# Patient Record
Sex: FEMALE | Race: BLACK OR AFRICAN AMERICAN | HISPANIC OR LATINO | Employment: FULL TIME | URBAN - METROPOLITAN AREA
[De-identification: names, ages, dates, MRNs, and addresses within clinical notes are randomized per-mention and may not be internally consistent; named-entity substitution may affect disease eponyms.]

---

## 2017-09-21 ENCOUNTER — HOSPITAL ENCOUNTER (EMERGENCY)
Facility: HOSPITAL | Age: 22
Discharge: HOME | End: 2017-09-21
Attending: EMERGENCY MEDICINE | Admitting: EMERGENCY MEDICINE
Payer: COMMERCIAL

## 2017-09-21 VITALS
HEIGHT: 62 IN | OXYGEN SATURATION: 97 % | TEMPERATURE: 97.6 F | RESPIRATION RATE: 18 BRPM | DIASTOLIC BLOOD PRESSURE: 65 MMHG | WEIGHT: 223 LBS | HEART RATE: 100 BPM | BODY MASS INDEX: 41.04 KG/M2 | SYSTOLIC BLOOD PRESSURE: 154 MMHG

## 2017-09-21 DIAGNOSIS — M54.9 BACK PAIN: Primary | ICD-10-CM

## 2017-09-21 PROCEDURE — 99283 EMERGENCY DEPT VISIT LOW MDM: CPT

## 2017-09-21 RX ORDER — IBUPROFEN 800 MG/1
TABLET ORAL EVERY 8 HOURS PRN
COMMUNITY
End: 2021-01-22 | Stop reason: ALTCHOICE

## 2017-09-21 RX ORDER — METHYLPREDNISOLONE 4 MG/1
TABLET ORAL
Qty: 21 TABLET | Refills: 0 | Status: SHIPPED | OUTPATIENT
Start: 2017-09-21 | End: 2021-01-22 | Stop reason: ALTCHOICE

## 2017-09-21 RX ORDER — CYCLOBENZAPRINE HCL 10 MG
10 TABLET ORAL 3 TIMES DAILY PRN
Qty: 10 TABLET | Refills: 0 | Status: SHIPPED | OUTPATIENT
Start: 2017-09-21 | End: 2021-01-22 | Stop reason: ALTCHOICE

## 2017-09-21 RX ORDER — NAPROXEN 500 MG/1
500 TABLET ORAL 2 TIMES DAILY WITH MEALS
Qty: 14 TABLET | Refills: 0 | Status: SHIPPED | OUTPATIENT
Start: 2017-09-21 | End: 2021-01-22 | Stop reason: ALTCHOICE

## 2018-01-10 NOTE — MISCELLANEOUS
Message  DCP&P Hotline called in reference to noncompliance issues with prenatal care and 4 positive UDS results  Hotline  stated someone from the [facility]  office would be in contact with pt  within 72hrs  Pt has a [address] address listed in [employer] but has [place] Medicaid  In [facility] system her address is listed as [address] [place] [address] which is her Mother's address  DCP&P made aware and will refer case to CPS if warranted  Rodolfo Harry,  will also notify [employer] about PA address  Active Problems    1  BMI 36 0-36 9,adult (V85 36) (Z68 36)   2  Maternal morbid obesity in second trimester, antepartum (649 13,278 01)   (O99 212,E66 01)   3  Need for Tdap vaccination (V06 1) (Z23)   4  Pregnancy (V22 2) (Z33 1)    Current Meds   1  Prenatal Complete 14-0 4 MG Oral Tablet; TAKE 1 TABLET DAILY; Therapy: 11OND5556 to (Evaluate:19Mar2017)  Requested for: 11PHT3264; Last   Rx:24Mar2016 Ordered    Allergies    1  No Known Drug Allergies    2  Apples   3  No Known Latex Allergies   4   Seasonal    Signatures   Electronically signed by : Rach Hernandez RN; Sep  6 2016 12:57PM EST                       (Author)

## 2018-01-12 NOTE — PROGRESS NOTES
2016         RE: Bridgette Schwarz                                   To: [address]   MR#: [id number]                                  [address]   : [address], [address]    ENC: [id number]                             Fax: [phone]   (Exam #: [id number])      The LMP of this 21year old,  G2, P0-0-1-0 patient was 2016, giving   her an ART of OCT 7 2016 and a current gestational age of 22 weeks 5 days   by dates  A sonographic examination was performed on 2016 using real   time equipment  The ultrasound examination was performed using abdominal &   vaginal techniques  The patient has a BMI of 36 0  Her blood pressure   today was 119/51  Earliest ultrasound found in her record: 2016  14 w4d 10/01/2016  ART            Arely Lozano has no complaints today  She reports fetal movement and denies   vaginal bleeding  She has not had genetic screening obtained  Arely Lozano has   not yet been screened for gestational diabetes during this pregnancy        Cardiac motion was observed at 140 bpm       INDICATIONS      fetal anatomical survey   morbid obesity      Exam Types      LEVEL II   Transvaginal      RESULTS      Fetus # 1 of 1   Vertex presentation   Fetal growth appeared normal   Placenta Location = Posterior, right lateral   No placenta previa   Placenta Grade = I      MEASUREMENTS (* Included In Average GA)      AC              16 2 cm        21 weeks 0 days* (36%)   BPD              5 5 cm        22 weeks 5 days* (70%)   HC              20 5 cm        22 weeks 4 days* (66%)   Femur            4 2 cm        23 weeks 6 days* (85%)      Nuchal Fold      3 9 mm      Humerus          3 8 cm        23 weeks 3 days  (82%)   Radius           3 0 cm        22 weeks 5 days   Ulna             3 4 cm        23 weeks 0 days   Tibia            3 6 cm        23 weeks 2 days  (85%)   Fibula           3 6 cm        22 weeks 4 days      Cerebellum       2 5 cm        24 weeks 0 days   Biorbit          3 8 cm        24 weeks 1 day   CisternaMagna    3 2 mm      HC/AC           1 27   FL/AC           0 26   FL/BPD          0 76   EFW (Ac/Fl/Hc)   494 grams - 1 lbs 1 oz      THE AVERAGE GESTATIONAL AGE is 22 weeks 4 days +/- 14 days  AMNIOTIC FLUID         Largest Vertical Pocket = 4 1 cm   Amniotic Fluid: Normal      ANATOMY SUMMARY      The fetal cranium appeared normal in shape  Choroid plexus cysts are not   present  The lateral ventricles were not dilated and the midline   structures were not deviated  The cerebellum and cisterna magna were   visualized and appeared normal    The calvarium showed no evidence of   defect, scalloping of the parietal bones or abnormal shape  The cavum   septum pellucidum appears normal  The fetal face was not imaged well  Anatomy of the fetal thorax appeared within normal limits  The fetal   diaphragm appears intact  There were no intrathoracic masses noted or   evidence of pleural/pericardial effusion  The aaortic arch appears   normal  The cardiac rate and rhythm are normal  The four-chamber, outflow   tract, 3 vessel tracheal, ductal arch, septal, and short axis views are   suboptimally imaged  The abdominal cavity appears normal  There is no   evidence of fetal bowel obstruction or abnormally echogenic bowel  Ascites   is not present  The fetal stomach appears normal in size and shape  The   right kidney appears normal  There is no suspicion of pyelectasis  Renal   cysts are not present  The echogenicity of the kidney is normal  The left   kidney appears normal   There is no suspicion of pyelectasis  The   echogenicity of the kidney is normal   renal cysts are not present  The   fetal bladder appears normal in size and shape  There is no suspicion of   ureterocele  The abdominal wall appears intact  A normal abdominal cord   insertion is noted   The spine was visualized from cervical to sacral   region, within the resolution of the ultrasound equipment, without   evidence of a neural tube defect  or other malformation  Active movement   of the extremities was seen and fetal body motion was also observed during   this examination  There was no evidence of long bone abnormality  The   distal extremities are suboptimally imaged  We cannot determine the fetal   gender  The placenta appears normal  There is no evidence of advanced   placental maturation, placental abruption, intervillous thrombosis,   placental infarction or multiple venous lakes  There is a 3 vessel cord  The placental cord insertion site is suboptimally imaged  The uterine   contour appears normal  There is no suspicion of a uterine myoma  ADNEXA      The left ovary appeared normal and measured 2 0 x 1 4 x 1 2 cm with a   volume of 1 8 cc  The right ovary appeared normal and measured 3 0 x 2 8 x   1 6 cm with a volume of 7 0 cc  UTERINE ARTERIES                                  S/D   PI    RI    NOTCH       Left Uterine Artery              0 60       Right Uterine Artery             0 70      CERVICAL EVALUATION      The cervix appeared normal (Ultrasound Examination)  SUPINE      Cervical Length: 3 30 cm      OTHER TEST RESULTS           Funneling?: No             Dynamic Changes?: No        Resp  To TFP?: No      IMPRESSION      Hurtado IUP   22 weeks and 4 days by this ultrasound  (ART=OCT 1 2016)   Vertex presentation   Fetal growth appeared normal   Regular fetal heart rate of 140 bpm   Posterior, right lateral placenta   No placenta previa      GENERAL COMMENT      No fetal structural abnormality or ultrasound marker for aneuploidy is   identified on the Level II ultrasound study today in a difficult and quite   limited study secondary to the constraints related to maternal morbid   obesity and unfavorable fetal position   In particular, suboptimal imaging   of facial, cardiac, and distal extremity anatomy is afforded  The   placental cord insertion site is not imaged well  Fetal growth, amniotic   fluid volume, and maternal uterine artery Doppler study are normal   The   placenta is normal in appearance  The cervix is normal in appearance by transvaginal sonography  The   cervical length is normal   Cervical debris is not present  Cervical   funneling is not present  Neither provocative nor dynamic change is   appreciated  Today's ultrasound findings and suggested follow-up were discussed in   detail with Harvinder Atkinson  She is aware of the limitations on today's study and   inability to image all anatomic targets  We discussed that prenatal   ultrasound cannot rule out all congenital abnormalities  Harvinder Atkinson will return   to the [facility]  in 2 weeks to assess anatomic targets not imaged   well today  Repeat assessment of fetal interval growth will be performed   at about 28 weeks gestation  Gestational diabetes screening should be   performed soon  The face to face time, in addition to time spent discussing ultrasound   results, was 10 minutes, greater than 50% of which was spent during   counseling and coordination of care  Rachel Goodpasture, R D M S Lavaughn Chiquito, M D     Maternal-Fetal Medicine   Electronically signed 06/03/16 15:18

## 2018-01-12 NOTE — PROGRESS NOTES
SEP 6 2016         RE: Nikhil Mercedes                                   To: [address] [facility]   MR#: [id number]                                  [address]   : [address] [facility], [address]    Sharyn 25: [id number]                             Fax: [phone]   (Exam #: [id number])      The LMP of this 24year old,  G2, P0-0-1-0 patient was 2016, giving   her an ART of OCT 7 2016 and a current gestational age of 27 weeks 4 days   by dates  A sonographic examination was performed on SEP 6 2016 using real   time equipment  The ultrasound examination was performed using abdominal   technique  The patient has a BMI of 37 9  Her blood pressure today was   112/80  Earliest ultrasound found in her record: 2016  14 w4d 10/01/2016  ART      Problem list   1  Increased BMI- she did not complete her early Glucola screen   2  Patient did not complete her quad screen   3  + UDS for THC in early pregnancy      Cardiac motion was observed at 129 bpm       INDICATIONS      increased BMI   fetal growth   Evaluate missed anatomy      Exam Types      Level I      RESULTS      Fetus # 1 of 1   Vertex presentation   Fetal growth appeared normal   Placenta Location = Posterior   No placenta previa   Placenta Grade = II      MEASUREMENTS (* Included In Average GA)      AC              31 6 cm        35 weeks 5 days* (53%)   BPD              8 9 cm        35 weeks 6 days* (53%)   HC              31 6 cm        34 weeks 6 days* (27%)   Femur            6 8 cm        34 weeks 4 days* (42%)      Cerebellum       4 6 cm        35 weeks 5 days      HC/AC           1 00   FL/AC           0 22   FL/BPD          0 77   EFW (Ac/Fl/Hc)  2644 grams - 5 lbs 13 oz                 (40%)      THE AVERAGE GESTATIONAL AGE is 35 weeks 2 days +/- 21 days        AMNIOTIC FLUID      Q1: 2 7      Q2: 5 3      Q3: 2 8      Q4: 5 1   MARTINE Total = 15 8 cm   Amniotic Fluid: Normal ANATOMY DETAILS      Visualized Appearing Sonographically Normal:   HEAD: (Calvarium, BPD Level, Cavum, Lateral Ventricles, Choroid Plexus,   Cerebellum, Cisterna Magna); HEART: (Four Chamber View, Proximal Left   Outflow, Proximal Right Outflow, 3 Vessel Trachea, Interventricular   Septum, Interatrial Septum, Cardiac Axis, Cardiac Position);    STOMACH,   RIGHT KIDNEY, LEFT KIDNEY, BLADDER, PLACENTA      IMPRESSION      Hurtado IUP   35 weeks and 2 days by this ultrasound  (ART=OCT 9 2016)   Vertex presentation   Fetal growth appeared normal   Regular fetal heart rate of 129 bpm   Posterior placenta   No placenta previa      GENERAL COMMENT      On exam today the patient appears well, in no acute distress, and denies   any complaints  Her abdomen is non-tender  The fetal anatomic survey is now complete  There is no sonographic   evidence of fetal abnormalities at this time  The remainder of the survey   was completed previously  There has been appropriate interval fetal   growth  Good fetal movement and tone are seen  The amniotic fluid volume   appears normal   The placenta is posterior and it appears sonographically   normal   The patient was informed of today's findings and all of her   questions were answered  The limitations of ultrasound were reviewed with   the patient, which she accepts  Precautions and fetal kick counts were   reviewed  Recommend the patient return as clinically indicated  Thank you very much for allowing us to participate in the care of this   very nice patient  Should you have any questions, please do not hesitate   to contact our office  JASWANT Ramirez M D     Electronically signed 09/06/16 16:06

## 2018-01-13 NOTE — PROGRESS NOTES
Message    Type of Encounter: Telephonic    Spoke to Patient  The reason for call is to discuss outreach for follow up/needed services, coordination of meeting care plan treatment goals and results  Attempted to call pt for + THC UDS on 3/29/16 no answer, phone rang several times no answer  noted in chart pt needs UDS Every visit   next positive need to contact State report      Patient Care Team    Care Team Member Role Specialty Office Number   Dang GIBBS , MD, error  - [phone]   Ninoska GIBBS  - [phone]   Queta GIBBS  - [phone]   Noel Morales [facility] [phone]   Stacey GIBBS    - [phone] [facility] [phone]     Signatures   Electronically signed by : RENA Gee ; 2016  4:04PM EST

## 2018-01-14 NOTE — PROGRESS NOTES
2016         RE: Stephanie Buys                                   To: [facility]   MR#: [id number]                                  [address]   : [address]   Dyana Maritza: [id number]                             Fax: [phone]   (Exam #: [id number])      The LMP of this 21year old,  2, para 0 patient was 2016,   giving her an ART of OCT 7 2016 and a current gestational age of 17 weeks   5 days by dates  A sonographic examination was performed on 2016   using real time equipment  The patient has a BMI of 35 7  Her blood   pressure today was 136/85  Earliest ultrasound found in her record: 2016  14 w4d 10/01/2016  ART       Thank you very much for referring this very nice patient for a genetic   screening ultrasound and confirmation of dating ultrasound  This is the   patient's second pregnancy  Her first pregnancy resulted in a termination   in   She has a history of iron deficiency anemia and class II   obesity  She denies current use of tobacco, alcohol, or drugs  Her   medications include vitamins and she has a significant drug allergies  Her family medical history is noncontributory  A review of systems is   otherwise negative  On exam, the patient appears well, in no acute   distress, and her abdomen is nontender  Multiple longitudinal and transverse sections revealed a light   intrauterine pregnancy with the fetus in breech presentation  The placenta   is posterior in implantation, grade I in appearance        Cardiac motion was observed at 150 bpm       INDICATIONS      first trimester genetic screening   pregnancy dating   obesity      Exam Types      Level I   sequential screen      RESULTS      Fetus # 1 of 1   Breech presentation      MEASUREMENTS (* Included In Average GA)      AC               8 3 cm        14 weeks 1 day * (74%)   BPD              2 7 cm        14 weeks 5 days*   HC               9 6 cm        14 weeks 2 days*   Femur            1 7 cm        14 weeks 6 days*      HC/AC           1 16   FL/AC           0 20   FL/BPD          0 62   EFW (Ac/Fl/Hc)   102 grams - 0 lbs 4 oz      THE AVERAGE GESTATIONAL AGE is 14 weeks 4 days +/- 7 days  ANATOMY COMMENTS      Anatomic detail is limited at this gestational age  The fetal face   appears normal  The nasal bone was not seen well due to fetal position  The intracranial anatomy was unremarkable  Limited evaluation of the   spine revealed no obvious evidence for a neural tube defect  Anatomy of   the fetal thorax appeared within normal limits  The cardiac rhythm was   regular  Within the abdomen, the, stomach was visualized and the abdominal   wall appeared intact  A three vessel cord appears to be present  Active   movement of the fetal body & 4 extremities was seen  There is no   suspicion of a subchorionic bleed  The placental cord insertion was   normal  The culdesac was reviewed and no fluid was seen  ADNEXA      The left ovary appeared normal and measured 2 4 x 2 8 x 2 4 cm with a   volume of 8 4 cc  The right ovary was not visualized  AMNIOTIC FLUID      Largest Vertical Pocket = 3 8 cm   Amniotic Fluid: Normal      IMPRESSION      Hurtado IUP   14 weeks and 4 days by this ultrasound  (ART=OCT 1 2016)   Breech presentation   Regular fetal heart rate of 150 bpm   Posterior placenta      GENERAL COMMENT      Today's ultrasound is consistent with dating by LMP  Unfortunately, a   crown-rump length is too far along for sequential screening  Early   anatomy is overall reassuring although it is limited by difficult fetal   position and early gestational age  The patient can have a quad screen up until 22 weeks gestation, and I   advised her that she could obtain this requisition from your office should   she so desire        The implications of obesity and pregnancy are significant  The level of   obesity is directly related to the risk of adverse pregnancy outcomes   including but not limited to, risk of diabetes, hypertensive disorders of   pregnancy, macrosomia, intrauterine growth restriction, labor and shoulder   dystocias,  section, and increased risk of stillbirth  Recommend   discussing the current weight gain recommendations for women with obesity   and discussing good dietary practices as well as the safety of exercise in   pregnancy  I recommend the patient gain no more than 10 pounds throughout   her entire pregnancy, increase her exercise and follow healthy dietary   habits  Consider referral to a dietitian should the patient have   difficulty following the aforementioned recommendations  Recommend third   trimester growth ultrasounds to screen for fetal growth problems as well   as ensuring the patient is appropriately screened for pregestational and   gestational diabetes  A fetal anatomic survey is recommended at around 20 weeks  Thank you very much for allowing us to participate in the care of this   very nice patient  Should you have any questions, please do not hesitate   to contact our office  Please note, in addition to the time spent discussing the results of the   ultrasound, I spent approximately 15 minutes of face-to-face time with the   patient, greater than 50% of which was spent in counseling and the   coordination of care for this patient  JASWANT Valencia M D     Electronically signed 16 13:18

## 2018-01-15 NOTE — MISCELLANEOUS
Message  Pt  cancelled OB appt  for today, and has cancelled last 3 appt's also  She was a [facility] for her [address] appt  also  She has rescheduled both appt's for 9/6/16  Called pt  and stressed importance of keeping those appt's and reminded of policy to notify DYFS after 3 missed appts  Pt  stated she would keep the appts  Active Problems    1  BMI 36 0-36 9,adult (V85 36) (Z68 36)   2  Maternal morbid obesity in second trimester, antepartum (649 13,278 01)   (O99 212,E66 01)   3  Need for Tdap vaccination (V06 1) (Z23)   4  Pregnancy (V22 2) (Z33 1)    Current Meds   1  Prenatal Complete 14-0 4 MG Oral Tablet; TAKE 1 TABLET DAILY; Therapy: 32QJM1933 to (Evaluate:19Mar2017)  Requested for: 98RAH8066; Last   Rx:24Mar2016 Ordered    Allergies    1  No Known Drug Allergies    2  Apples   3  No Known Latex Allergies   4   Seasonal    Signatures   Electronically signed by : Kar Clark RN; Sep  1 2016 10:53AM EST                       (Author)

## 2018-01-16 NOTE — PROGRESS NOTES
Message    Type of Encounter: Telephonic    Spoke to Other   Received call from [place] DCP&P stating they would be referring this case to [place] CPS as pt  does reside in [place] now  Patient Care Team    Care Team Member Role Specialty Office Number   Erick GIBBS MD, error  - [phone]   Fifi GIBBS  - [phone]   Go GIBBS  - [phone]   Martín Morales [phone]   Archana GIBBS  - [phone]   [address] [place] [address] [phone]   Raymond GIBSB    [facility] [phone]     Signatures   Electronically signed by : Cassidy Pérez RN; Sep 12 2016  9:59AM EST                       (Author)

## 2018-01-16 NOTE — PROGRESS NOTES
RUTHIE 15 2016         RE: Brian Murray                                   To: [facility]   MR#: [id number]                                  [address]   : [address], [address]: [id number]                             Fax: [phone]   (Exam #: [id number])      The LMP of this 21year old,  G2, P0-0-1-0 patient was 2016, giving   her an ART of OCT 7 2016 and a current gestational age of 20 weeks 5 days   by dates  A sonographic examination was performed on RUTHIE 15 2016 using   real time equipment  The ultrasound examination was performed using   abdominal technique  The patient has a BMI of 37 1  Her blood pressure   today was 115/63  Earliest ultrasound found in her record: 2016  14 w4d 10/01/2016  ART      Problem list   1  Increased BMI- she did not complete her early Glucola screen   2  Patient did not complete her quad screen   3  + UDS for THC in early pregnancy      Cardiac motion was observed at 151 bpm       INDICATIONS      missed anatomy follow up   increased BMI      Exam Types      Level I      RESULTS      Fetus # 1 of 1   Vertex presentation   Placenta Location = Posterior, fundal   No placenta previa   Placenta Grade = II      AMNIOTIC FLUID         Largest Vertical Pocket = 4 0 cm   Amniotic Fluid: Normal      MEASUREMENTS (* Included In Average GA)      Femur            4 3 cm        24 weeks 1 day * (48%)      Humerus          4 1 cm        24 weeks 6 days  (67%)   Radius           3 6 cm        26 weeks 3 days   Ulna             3 9 cm        25 weeks 6 days   Tibia            4 1 cm        25 weeks 4 days  (86%)   Fibula           3 9 cm        23 weeks 6 days      THE AVERAGE GESTATIONAL AGE is 24 weeks 1 day +/- 14 days  ANATOMY COMMENTS      Fetal anatomy has been previously documented; no anomalies were   identified   The prior US was limited in the area of the TV Volume Wizard App, HEART, EXTREMITIES, PCI which were seen today and appear normal although the   cardiac septum views are fair and need to be repeated on a future scan  IMPRESSION      Hurtado IUP   24 weeks and 1 day by this ultrasound  (ART=OCT 4 2016)   Vertex presentation   Regular fetal heart rate of 151 bpm   Posterior, fundal placenta   No placenta previa      RECOMMENDATION      Growth Ultrasound: at 28 weeks      GENERAL COMMENT      We will review missed views of the fetal cardiac septum at her previously   scheduled growth visit at 28 weeks  JASWANT Lind M D     Maternal-Fetal Medicine   Electronically signed 06/15/16 19:35

## 2018-12-17 ENCOUNTER — HOSPITAL ENCOUNTER (EMERGENCY)
Facility: HOSPITAL | Age: 23
Discharge: HOME | End: 2018-12-17
Attending: EMERGENCY MEDICINE | Admitting: EMERGENCY MEDICINE
Payer: COMMERCIAL

## 2018-12-17 VITALS
OXYGEN SATURATION: 98 % | TEMPERATURE: 98.7 F | RESPIRATION RATE: 20 BRPM | BODY MASS INDEX: 39.67 KG/M2 | WEIGHT: 215.6 LBS | SYSTOLIC BLOOD PRESSURE: 120 MMHG | HEIGHT: 62 IN | HEART RATE: 113 BPM | DIASTOLIC BLOOD PRESSURE: 71 MMHG

## 2018-12-17 DIAGNOSIS — R11.2 NAUSEA VOMITING AND DIARRHEA: Primary | ICD-10-CM

## 2018-12-17 DIAGNOSIS — R19.7 NAUSEA VOMITING AND DIARRHEA: Primary | ICD-10-CM

## 2018-12-17 LAB
BACTERIA UR QL AUTO: ABNORMAL /HPF
BILIRUB UR QL STRIP: NEGATIVE
CLARITY UR: ABNORMAL
COLOR UR: YELLOW
EXT PREG TEST URINE: NEGATIVE
GLUCOSE UR STRIP-MCNC: NEGATIVE MG/DL
HGB UR QL STRIP.AUTO: NEGATIVE
KETONES UR STRIP-MCNC: NEGATIVE MG/DL
LEUKOCYTE ESTERASE UR QL STRIP: ABNORMAL
MUCOUS THREADS UR QL AUTO: ABNORMAL
NITRITE UR QL STRIP: NEGATIVE
NON-SQ EPI CELLS URNS QL MICRO: ABNORMAL /HPF
PH UR STRIP.AUTO: 6 [PH] (ref 4.5–8)
PROT UR STRIP-MCNC: NEGATIVE MG/DL
RBC #/AREA URNS AUTO: ABNORMAL /HPF
SP GR UR STRIP.AUTO: 1.02 (ref 1–1.03)
UROBILINOGEN UR QL STRIP.AUTO: 0.2 E.U./DL
WBC #/AREA URNS AUTO: ABNORMAL /HPF

## 2018-12-17 PROCEDURE — 81025 URINE PREGNANCY TEST: CPT | Performed by: PHYSICIAN ASSISTANT

## 2018-12-17 PROCEDURE — 81001 URINALYSIS AUTO W/SCOPE: CPT | Performed by: PHYSICIAN ASSISTANT

## 2018-12-17 PROCEDURE — 99283 EMERGENCY DEPT VISIT LOW MDM: CPT

## 2018-12-17 RX ORDER — DICYCLOMINE HCL 20 MG
20 TABLET ORAL 2 TIMES DAILY
Qty: 10 TABLET | Refills: 0 | Status: SHIPPED | OUTPATIENT
Start: 2018-12-17 | End: 2021-01-22 | Stop reason: ALTCHOICE

## 2018-12-17 RX ORDER — ONDANSETRON 4 MG/1
4 TABLET, ORALLY DISINTEGRATING ORAL EVERY 4 HOURS PRN
Qty: 10 TABLET | Refills: 0 | Status: SHIPPED | OUTPATIENT
Start: 2018-12-17 | End: 2021-01-22 | Stop reason: ALTCHOICE

## 2018-12-17 NOTE — DISCHARGE INSTRUCTIONS
Acute Nausea and Vomiting, Ambulatory Care   GENERAL INFORMATION:   Acute nausea and vomiting  starts suddenly, gets worse quickly, and lasts a short time  Nausea and vomiting may be caused by pregnancy, alcohol, infection, or medicines  Common related symptoms include the following:   · Fever    · Abdominal swelling    · Pain, tenderness, or a lump in the abdomen    · Splashing sounds heard in your stomach when you move  Seek immediate care for the following symptoms:   · Blood in your vomit or bowel movements    · Sudden, severe pain in your chest and upper abdomen after hard vomiting    · Dizziness, dry mouth, and thirst    · Urinating very little or not at all    · Muscle weakness, leg cramps, and trouble breathing    · A heart beat that is faster than normal    · Vomiting for more than 48 hours  Treatment for acute nausea and vomiting  may include medicines to calm your stomach and stop the vomiting  You may need IV fluids if you are dehydrated  Manage your nausea and vomiting:   · Drink liquids as directed to prevent dehydration  Ask how much liquid to drink each day and which liquids are best for you  You may need to drink an oral rehydration solution (ORS)  ORS contains water, salts, and sugar that are needed to replace the lost body fluids  Ask what kind of ORS to use, how much to drink, and where to get it  · Eat smaller meals, more often  Eat small amounts of food every 2 to 3 hours, even if you are not hungry  Food in your stomach may help decrease your nausea  · Avoid stress  Find ways to relax and manage your stress  Headaches due to stress may cause nausea and vomiting  Get more rest and sleep  Follow up with your healthcare provider as directed:  Write down your questions so you remember to ask them during your visits  CARE AGREEMENT:   You have the right to help plan your care  Learn about your health condition and how it may be treated   Discuss treatment options with your caregivers to decide what care you want to receive  You always have the right to refuse treatment  The above information is an  only  It is not intended as medical advice for individual conditions or treatments  Talk to your doctor, nurse or pharmacist before following any medical regimen to see if it is safe and effective for you  © 2014 [address] is for End User's use only and may not be sold, redistributed or otherwise used for commercial purposes  All illustrations and images included in CareNotes® are the copyrighted property of A [facility] [employer]  or Martínez Guzmán  Acute Diarrhea   WHAT YOU NEED TO KNOW:   Acute diarrhea starts quickly and lasts a short time, usually 1 to 3 days  It can last up to 2 weeks  You may not be able to control your diarrhea  Acute diarrhea usually stops on its own  DISCHARGE INSTRUCTIONS:   Return to the emergency department if:   · You feel confused  · Your heartbeat is faster than normal      · Your eyes look deeply sunken, or you have no tears when you cry  · You urinate less than usual, or your urine is dark yellow  · You have blood or mucus in your stools  · You have severe abdominal pain  · You are unable to drink any liquids  Contact your healthcare provider if:   · Your symptoms do not get better with treatment  · You have a fever higher than 101 3°F (38 5°C)  · You have trouble eating and drinking because you are vomiting  · You are thirsty or have a dry mouth  · Your diarrhea does not get better in 7 days  · You have questions or concerns about your condition or care  Follow up with your healthcare provider as directed:  Write down your questions so you remember to ask them during your visits  Medicines:  · Diarrhea medicine  is an over-the-counter medicine that helps slow or stop your diarrhea   If you take other medicines, talk to your healthcare provider before you take diarrhea medicine  · Antibiotics  may be given to help treat an infection caused by bacteria  · Antiparasitics  may be given to treat an infection caused by parasites  · Take your medicine as directed  Contact your healthcare provider if you think your medicine is not helping or if you have side effects  Tell him of her if you are allergic to any medicine  Keep a list of the medicines, vitamins, and herbs you take  Include the amounts, and when and why you take them  Bring the list or the pill bottles to follow-up visits  Carry your medicine list with you in case of an emergency  Self-care:   · Drink liquids as directed  Liquids will help prevent dehydration caused by diarrhea  Ask your healthcare provider how much liquid to drink each day and which liquids are best for you  You may need to drink an oral rehydration solution (ORS)  An ORS has the right amounts of water, salts, and sugar you need to replace body fluids  You can buy an ORS at most grocery stores and pharmacies  · Eat foods that are easy to digest   Examples include rice, lentils, cereal, bananas, potatoes, and bread  It also includes some fruits (bananas, melon), well-cooked vegetables, and lean meats  Avoid foods high in fiber, fat, and sugar  Also avoid caffeine, alcohol, dairy, and red meat until your diarrhea is gone  Prevent acute diarrhea:   · Wash your hands often  Use soap and water  Wash your hands before you eat or prepare food  Also wash your hands after you use the bathroom  Use an alcohol-based hand gel when soap and water are not available  · Keep bathroom surfaces clean  This helps prevent the spread of germs that cause acute diarrhea  · Wash fruits and vegetables well before you eat them  This can help remove germs that cause diarrhea  If possible, remove the skin from fruits and vegetables, or cook them well before you eat them  · Cook meat as directed        ¨ Cook ground meat  to 160°F      [employer] poultry, whole poultry, or cuts of poultry  to at least 165°F  Remove the meat from heat  Let it stand for 3 minutes before you eat it  ¨ Cook whole cuts of meat other than poultry  to at least 145°F  Remove the meat from heat  Let it stand for 3 minutes before you eat it  · Wash dishes that have touched raw meat with hot water and soap  This includes cutting boards, utensils, dishes, and serving containers  · Place raw or cooked meat in the refrigerator as soon as possible  Bacteria can grow in meat that is left at room temperature too long  · Do not eat raw or undercooked oysters, clams, or mussels  These foods may be contaminated and cause infection  · Drink filtered or treated water only when you travel  Do not put ice in your drinks  Drink bottled water whenever possible  © 2017 [address] Information is for End User's use only and may not be sold, redistributed or otherwise used for commercial purposes  All illustrations and images included in CareNotes® are the copyrighted property of [email or web address]  or Martínez Guzmán  The above information is an  only  It is not intended as medical advice for individual conditions or treatments  Talk to your doctor, nurse or pharmacist before following any medical regimen to see if it is safe and effective for you

## 2018-12-18 NOTE — ED PROVIDER NOTES
History  Chief Complaint   Patient presents with    Vomiting     Vomiting and diarrhea since last night       History provided by:  Patient  Vomiting   Severity:  Mild  Duration:  6 hours  Timing:  Intermittent  Number of daily episodes:  2  Quality:  Unable to specify  Able to tolerate:  Liquids and solids  Progression:  Unable to specify  Chronicity:  New  Recent urination:  Normal  Context: not post-tussive and not self-induced    Relieved by:  Nothing  Worsened by:  Nothing  Ineffective treatments:  None tried  Associated symptoms: abdominal pain and diarrhea    Associated symptoms: no arthralgias, no chills, no cough, no fever, no headaches, no myalgias, no sore throat and no URI    Abdominal pain:     Pain location: lower abdomen  Quality: cramping      Severity:  Moderate    Onset quality:  Sudden    Duration:  10 hours    Timing:  Sporadic    Progression: resolves after diarrhea stool x 3  Chronicity:  New  Diarrhea:     Quality:  Watery and semi-solid    Number of occurrences:  3    Duration:  10 hours    Timing:  Intermittent    Progression:  Unable to specify  Risk factors: sick contacts (son with recent same sx)    Risk factors: no alcohol use, no diabetes, not pregnant, no prior abdominal surgery, no suspect food intake and no travel to endemic areas        Prior to Admission Medications   Prescriptions Last Dose Informant Patient Reported? Taking? Methylprednisolone 4 MG TBPK   No No   Sig: Use as directed on package   cyclobenzaprine (FLEXERIL) 10 mg tablet   No No   Sig: Take 1 tablet by mouth 3 (three) times a day as needed for muscle spasms for up to 10 doses   ibuprofen (MOTRIN) 800 mg tablet   Yes No   Sig: Take by mouth every 8 (eight) hours as needed for mild pain   naproxen (NAPROSYN) 500 mg tablet   No No   Sig: Take 1 tablet by mouth 2 (two) times a day with meals for 7 days      Facility-Administered Medications: None       History reviewed   No pertinent past medical history  History reviewed  No pertinent surgical history  History reviewed  No pertinent family history  I have reviewed and agree with the history as documented  Social History   Substance Use Topics    Smoking status: Never Smoker    Smokeless tobacco: Never Used    Alcohol use No        Review of Systems   Constitutional: Negative for activity change, appetite change, chills and fever  HENT: Negative for congestion and sore throat  Eyes: Negative for pain and visual disturbance  Respiratory: Negative for cough and shortness of breath  Cardiovascular: Negative for chest pain and leg swelling  Gastrointestinal: Positive for abdominal pain, diarrhea, nausea and vomiting  Negative for abdominal distention, blood in stool and constipation  Genitourinary: Negative for decreased urine volume, difficulty urinating, flank pain and frequency  Musculoskeletal: Negative for arthralgias, back pain, gait problem, myalgias and neck pain  Skin: Negative for rash and wound  Allergic/Immunologic: Negative for immunocompromised state  Neurological: Negative for dizziness and headaches  Physical Exam  Physical Exam   Constitutional: She is oriented to person, place, and time  She appears well-developed and well-nourished  No distress  Pt eating a bosch/egg breakfast sandwich during my initial examination   HENT:   Head: Normocephalic and atraumatic  Nose: Nose normal    Mouth/Throat: Oropharynx is clear and moist    Eyes: Pupils are equal, round, and reactive to light  Conjunctivae are normal    Neck: Neck supple  Cardiovascular: Normal rate, regular rhythm, normal heart sounds and intact distal pulses  No murmur heard  Pulmonary/Chest: Effort normal and breath sounds normal  No respiratory distress  She exhibits no tenderness  Abdominal: Soft  Bowel sounds are normal  She exhibits no distension and no mass  There is no tenderness  There is no rebound and no guarding  No hernia  Musculoskeletal: She exhibits no edema  Neurological: She is alert and oriented to person, place, and time  Skin: Skin is warm and dry  Capillary refill takes less than 2 seconds  She is not diaphoretic  Psychiatric: She has a normal mood and affect  Nursing note and vitals reviewed        Vital Signs  ED Triage Vitals [12/17/18 0957]   Temperature Pulse Respirations Blood Pressure SpO2   98 7 °F (37 1 °C) (!) 113 20 120/71 98 %      Temp Source Heart Rate Source Patient Position - Orthostatic VS BP Location FiO2 (%)   Temporal Monitor Sitting Right arm --      Pain Score       --           Vitals:    12/17/18 0957   BP: 120/71   Pulse: (!) 113   Patient Position - Orthostatic VS: Sitting       Visual Acuity      ED Medications  Medications - No data to display    Diagnostic Studies  Results Reviewed     Procedure Component Value Units Date/Time    Urine Microscopic [[id number]]  (Abnormal) Resulted:  12/17/18 1127    Lab Status:  Final result Specimen:  Urine Updated:  12/17/18 1127     RBC, UA None Seen /hpf      WBC, UA 4-10 (A) /hpf      Epithelial Cells Moderate (A) /hpf      Bacteria, UA Occasional /hpf      MUCUS THREADS Occasional (A)    UA w Reflex to Microscopic w Reflex to Culture [[id number]]  (Abnormal) Resulted:  12/17/18 1110    Lab Status:  Final result Specimen:  Urine Updated:  12/17/18 1110     Color, UA Yellow     Clarity, UA Cloudy     Specific Gravity, UA 1 025     pH, UA 6 0     Leukocytes, UA Small (A)     Nitrite, UA Negative     Protein, UA Negative mg/dl      Glucose, UA Negative mg/dl      Ketones, UA Negative mg/dl      Urobilinogen, UA 0 2 E U /dl      Bilirubin, UA Negative     Blood, UA Negative    POCT pregnancy, urine [[id number]]  (Normal) Resulted:  12/17/18 1103    Lab Status:  Final result Updated:  12/17/18 1104     EXT PREG TEST UR (Ref: Negative) negative                 No orders to display              Procedures  Procedures       Phone Contacts  ED Phone Contact    ED Course  ED Course as of Dec 18 1338   Mon Dec 17, 2018   1115 Color, UA: Yellow   1115 Leukocytes, UA: (!) Small   1115 Nitrite, UA: Negative   1115 Blood, UA: Negative                               [facility]  Number of Diagnoses or Management Options  Nausea vomiting and diarrhea: new and requires workup     Amount and/or Complexity of Data Reviewed  Clinical lab tests: ordered and reviewed    Risk of Complications, Morbidity, and/or Mortality  Presenting problems: low  Diagnostic procedures: low  Management options: low    Patient Progress  Patient progress: stable    CritCare Time    Disposition  Final diagnoses:   Nausea vomiting and diarrhea     Time reflects when diagnosis was documented in both MDM as applicable and the Disposition within this note     Time User Action Codes Description Comment    12/17/2018 11:04 AM Sylwia Pham Add [R11 2,  R19 7] Nausea vomiting and diarrhea       ED Disposition     ED Disposition Condition Comment    Discharge  Sherine Rg discharge to home/self care      Condition at discharge: Good        Follow-up Information     Follow up With Specialties Details Why Contact Info    [employer]  Schedule an appointment as soon as possible for a visit To establish [phone]            Discharge Medication List as of 12/17/2018 11:06 AM      START taking these medications    Details   dicyclomine (BENTYL) 20 mg tablet Take 1 tablet (20 mg total) by mouth 2 (two) times a day, Starting Mon 12/17/2018, Normal      ondansetron (ZOFRAN-ODT) 4 mg disintegrating tablet Take 1 tablet (4 mg total) by mouth every 4 (four) hours as needed for nausea or vomiting, Starting Mon 12/17/2018, Normal         CONTINUE these medications which have NOT CHANGED    Details   cyclobenzaprine (FLEXERIL) 10 mg tablet Take 1 tablet by mouth 3 (three) times a day as needed for muscle spasms for up to 10 doses, Starting Thu 9/21/2017, Print      ibuprofen (MOTRIN) 800 mg tablet Take by mouth every 8 (eight) hours as needed for mild pain, Historical Med      Methylprednisolone 4 MG TBPK Use as directed on package, Print      naproxen (NAPROSYN) 500 mg tablet Take 1 tablet by mouth 2 (two) times a day with meals for 7 days, Starting Thu 9/21/2017, Until Thu 9/28/2017, Print           No discharge procedures on file      ED Provider  Electronically Signed by           Jose Greer PA-C  12/18/18 0874

## 2019-09-22 ENCOUNTER — HOSPITAL ENCOUNTER (EMERGENCY)
Facility: HOSPITAL | Age: 24
Discharge: HOME | End: 2019-09-22
Attending: EMERGENCY MEDICINE

## 2019-09-22 VITALS
HEIGHT: 62 IN | HEART RATE: 95 BPM | TEMPERATURE: 99 F | SYSTOLIC BLOOD PRESSURE: 164 MMHG | DIASTOLIC BLOOD PRESSURE: 72 MMHG | RESPIRATION RATE: 18 BRPM | OXYGEN SATURATION: 98 % | BODY MASS INDEX: 39.68 KG/M2 | WEIGHT: 215.61 LBS

## 2019-09-22 DIAGNOSIS — J03.90 ACUTE TONSILLITIS: Primary | ICD-10-CM

## 2019-09-22 PROCEDURE — 99282 EMERGENCY DEPT VISIT SF MDM: CPT

## 2019-09-22 PROCEDURE — 99284 EMERGENCY DEPT VISIT MOD MDM: CPT | Performed by: PHYSICIAN ASSISTANT

## 2019-09-22 RX ORDER — AMOXICILLIN 500 MG/1
500 CAPSULE ORAL 3 TIMES DAILY
Qty: 21 CAPSULE | Refills: 0 | Status: SHIPPED | OUTPATIENT
Start: 2019-09-22 | End: 2019-09-29

## 2019-09-22 NOTE — ED PROVIDER NOTES
History  Chief Complaint   Patient presents with    Sore Throat     sore throat with complaints of ear pain to the right ear     Patient presents to the emergency department today offering a chief complaint of a sore throat as well as left-sided ear pain  Patient states the symptoms have been present for 6 days  Pain is in the left ear as well as the back of the throat  Pain with swallowing  Occasional fever chills sweats  Denies cough  Otherwise asymptomatic  Prior to Admission Medications   Prescriptions Last Dose Informant Patient Reported? Taking? Methylprednisolone 4 MG TBPK   No No   Sig: Use as directed on package   cyclobenzaprine (FLEXERIL) 10 mg tablet   No No   Sig: Take 1 tablet by mouth 3 (three) times a day as needed for muscle spasms for up to 10 doses   dicyclomine (BENTYL) 20 mg tablet   No No   Sig: Take 1 tablet (20 mg total) by mouth 2 (two) times a day   ibuprofen (MOTRIN) 800 mg tablet   Yes No   Sig: Take by mouth every 8 (eight) hours as needed for mild pain   naproxen (NAPROSYN) 500 mg tablet   No No   Sig: Take 1 tablet by mouth 2 (two) times a day with meals for 7 days   ondansetron (ZOFRAN-ODT) 4 mg disintegrating tablet   No No   Sig: Take 1 tablet (4 mg total) by mouth every 4 (four) hours as needed for nausea or vomiting      Facility-Administered Medications: None       History reviewed  No pertinent past medical history  History reviewed  No pertinent surgical history  History reviewed  No pertinent family history  I have reviewed and agree with the history as documented  Social History     Tobacco Use    Smoking status: Never Smoker    Smokeless tobacco: Never Used   Substance Use Topics    Alcohol use: No    Drug use: No        Review of Systems   Constitutional: Negative  Negative for chills and fever  HENT: Positive for ear pain and sore throat  Negative for trouble swallowing  Eyes: Negative  Respiratory: Negative    Negative for cough, shortness of breath and wheezing  Cardiovascular: Negative  Negative for chest pain and leg swelling  Gastrointestinal: Negative  Negative for abdominal pain, blood in stool and vomiting  Endocrine: Negative  Genitourinary: Negative  Musculoskeletal: Negative  Negative for neck stiffness  Skin: Negative  Allergic/Immunologic: Negative  Neurological: Negative  Negative for dizziness, seizures, speech difficulty, weakness, light-headedness, numbness and headaches  Hematological: Negative  Psychiatric/Behavioral: Negative  All other systems reviewed and are negative  Physical Exam  Physical Exam   Constitutional: She is oriented to person, place, and time  Vital signs are normal  She appears well-developed and well-nourished  She does not have a sickly appearance  She does not appear ill  No distress  HENT:   Right Ear: Tympanic membrane and external ear normal  No swelling  Tympanic membrane is not bulging  Left Ear: Tympanic membrane and external ear normal  No swelling  Tympanic membrane is not bulging  Nose: Nose normal    Mouth/Throat: No oral lesions  No uvula swelling  Posterior oropharyngeal erythema present  No oropharyngeal exudate or posterior oropharyngeal edema  Tonsillar exudate  Eyes: Pupils are equal, round, and reactive to light  Conjunctivae, EOM and lids are normal    Neck: Normal range of motion  Neck supple  No JVD present  No tracheal deviation, no edema and normal range of motion present  No thyromegaly present  Cardiovascular: Normal rate, regular rhythm, normal heart sounds, intact distal pulses and normal pulses  Exam reveals no gallop and no friction rub  No murmur heard  Pulmonary/Chest: Effort normal and breath sounds normal  No stridor  No respiratory distress  She has no wheezes  She has no rales  She exhibits no tenderness  Abdominal: Soft  Bowel sounds are normal  She exhibits no distension and no mass  There is no tenderness   There is no rebound, no guarding and negative Winchester's sign  No hernia  Musculoskeletal: Normal range of motion  She exhibits no edema or tenderness  Lymphadenopathy:     She has no cervical adenopathy  Neurological: She is alert and oriented to person, place, and time  She has normal strength and normal reflexes  No cranial nerve deficit or sensory deficit  GCS eye subscore is 4  GCS verbal subscore is 5  GCS motor subscore is 6  Skin: Skin is warm and dry  Capillary refill takes less than 2 seconds  No rash noted  She is not diaphoretic  No erythema  No pallor  Psychiatric: She has a normal mood and affect  Her speech is normal and behavior is normal    Vitals reviewed  Vital Signs  ED Triage Vitals [09/22/19 1558]   Temperature Pulse Respirations Blood Pressure SpO2   99 °F (37 2 °C) 95 18 164/72 98 %      Temp Source Heart Rate Source Patient Position - Orthostatic VS BP Location FiO2 (%)   Temporal Monitor -- Right arm --      Pain Score       4           Vitals:    09/22/19 1558   BP: 164/72   Pulse: 95         Visual Acuity      ED Medications  Medications - No data to display    Diagnostic Studies  Results Reviewed     None                 No orders to display              Procedures  Procedures       ED Course                               MDM    Disposition  Final diagnoses:   Acute tonsillitis     Time reflects when diagnosis was documented in both MDM as applicable and the Disposition within this note     Time User Action Codes Description Comment    9/22/2019  4:01 PM Rogelio GIBBS Add [J03 90] Acute tonsillitis       ED Disposition     ED Disposition Condition Date/Time Comment    Discharge Stable Sun Sep 22, 2019  4:01 PM Nguyen Old discharge to home/self care              Follow-up Information     Follow up With Specialties Details Why Contact Info    PCP              Discharge Medication List as of 9/22/2019  4:02 PM      START taking these medications    Details   amoxicillin (AMOXIL) 500 mg capsule Take 1 capsule (500 mg total) by mouth 3 (three) times a day for 7 days, Starting Sun 9/22/2019, Until Sun 9/29/2019, Print         CONTINUE these medications which have NOT CHANGED    Details   cyclobenzaprine (FLEXERIL) 10 mg tablet Take 1 tablet by mouth 3 (three) times a day as needed for muscle spasms for up to 10 doses, Starting Thu 9/21/2017, Print      dicyclomine (BENTYL) 20 mg tablet Take 1 tablet (20 mg total) by mouth 2 (two) times a day, Starting Mon 12/17/2018, Normal      ibuprofen (MOTRIN) 800 mg tablet Take by mouth every 8 (eight) hours as needed for mild pain, Historical Med      Methylprednisolone 4 MG TBPK Use as directed on package, Print      naproxen (NAPROSYN) 500 mg tablet Take 1 tablet by mouth 2 (two) times a day with meals for 7 days, Starting Thu 9/21/2017, Until Thu 9/28/2017, Print      ondansetron (ZOFRAN-ODT) 4 mg disintegrating tablet Take 1 tablet (4 mg total) by mouth every 4 (four) hours as needed for nausea or vomiting, Starting Mon 12/17/2018, Normal           No discharge procedures on file      ED Provider  Electronically Signed by           Eron Short PA-C  09/22/19 0549

## 2019-11-08 ENCOUNTER — HOSPITAL ENCOUNTER (EMERGENCY)
Facility: HOSPITAL | Age: 24
Discharge: HOME | End: 2019-11-08
Attending: EMERGENCY MEDICINE | Admitting: EMERGENCY MEDICINE
Payer: COMMERCIAL

## 2019-11-08 VITALS
WEIGHT: 208.11 LBS | HEART RATE: 111 BPM | TEMPERATURE: 100.5 F | SYSTOLIC BLOOD PRESSURE: 133 MMHG | OXYGEN SATURATION: 98 % | BODY MASS INDEX: 38.06 KG/M2 | RESPIRATION RATE: 19 BRPM | DIASTOLIC BLOOD PRESSURE: 85 MMHG

## 2019-11-08 DIAGNOSIS — J03.90 ACUTE TONSILLITIS: Primary | ICD-10-CM

## 2019-11-08 PROCEDURE — 99284 EMERGENCY DEPT VISIT MOD MDM: CPT | Performed by: PHYSICIAN ASSISTANT

## 2019-11-08 PROCEDURE — 99282 EMERGENCY DEPT VISIT SF MDM: CPT

## 2019-11-08 RX ORDER — AMOXICILLIN AND CLAVULANATE POTASSIUM 875; 125 MG/1; MG/1
1 TABLET, FILM COATED ORAL EVERY 12 HOURS SCHEDULED
Qty: 14 TABLET | Refills: 0 | Status: SHIPPED | OUTPATIENT
Start: 2019-11-08 | End: 2019-11-15

## 2019-11-08 RX ORDER — AMOXICILLIN AND CLAVULANATE POTASSIUM 875; 125 MG/1; MG/1
1 TABLET, FILM COATED ORAL ONCE
Status: COMPLETED | OUTPATIENT
Start: 2019-11-08 | End: 2019-11-08

## 2019-11-08 RX ORDER — ACETAMINOPHEN 325 MG/1
650 TABLET ORAL ONCE
Status: COMPLETED | OUTPATIENT
Start: 2019-11-08 | End: 2019-11-08

## 2019-11-08 RX ADMIN — ACETAMINOPHEN 650 MG: 325 TABLET, FILM COATED ORAL at 09:32

## 2019-11-08 RX ADMIN — AMOXICILLIN AND CLAVULANATE POTASSIUM 1 TABLET: 875; 125 TABLET, FILM COATED ORAL at 09:33

## 2019-11-08 NOTE — ED PROVIDER NOTES
History  Chief Complaint   Patient presents with    Sore Throat     pt c/o sore throat, fever, head cold, ear pain  has been taking OTC cold/flu meds without relief  The patient presents to the emergency department today for evaluation of a sore throat  I did see her in the end of September she did have acute tonsillitis at that point she was treated successfully with penicillin products  She states she did well until last week when she noted a head cold  She notes facial pressure nasal congestion however the sore throat fevers began over the last few days  She round temperature of a 101° F last night  She is actually very well-appearing at bedside a does not appear toxic whatsoever  Heart rate initially was around 115 however upon resting was 93 beats per minute  She denies abdominal pain nausea vomiting  She denies cough  No ear pain  No urinary urgency frequency burning  She denies any possibility whatsoever of pregnancy secondary to her sexual preference  Prior to Admission Medications   Prescriptions Last Dose Informant Patient Reported? Taking?    Methylprednisolone 4 MG TBPK Not Taking at Unknown time  No No   Sig: Use as directed on package   Patient not taking: Reported on 11/8/2019   cyclobenzaprine (FLEXERIL) 10 mg tablet Not Taking at Unknown time  No No   Sig: Take 1 tablet by mouth 3 (three) times a day as needed for muscle spasms for up to 10 doses   Patient not taking: Reported on 11/8/2019   dicyclomine (BENTYL) 20 mg tablet Not Taking at Unknown time  No No   Sig: Take 1 tablet (20 mg total) by mouth 2 (two) times a day   Patient not taking: Reported on 11/8/2019   ibuprofen (MOTRIN) 800 mg tablet More than a month at Unknown time  Yes No   Sig: Take by mouth every 8 (eight) hours as needed for mild pain   naproxen (NAPROSYN) 500 mg tablet   No No   Sig: Take 1 tablet by mouth 2 (two) times a day with meals for 7 days   ondansetron (ZOFRAN-ODT) 4 mg disintegrating tablet Not Taking at Unknown time  No No   Sig: Take 1 tablet (4 mg total) by mouth every 4 (four) hours as needed for nausea or vomiting   Patient not taking: Reported on 11/8/2019      Facility-Administered Medications: None       History reviewed  No pertinent past medical history  History reviewed  No pertinent surgical history  History reviewed  No pertinent family history  I have reviewed and agree with the history as documented  Social History     Tobacco Use    Smoking status: Never Smoker    Smokeless tobacco: Never Used   Substance Use Topics    Alcohol use: No    Drug use: No        Review of Systems   Constitutional: Positive for fever  Negative for chills  HENT: Positive for sinus pressure, sinus pain and sore throat  Negative for trouble swallowing  Eyes: Negative  Respiratory: Negative  Negative for cough, shortness of breath and wheezing  Cardiovascular: Negative  Negative for chest pain and leg swelling  Gastrointestinal: Negative  Negative for abdominal pain, blood in stool and vomiting  Endocrine: Negative  Genitourinary: Negative  Musculoskeletal: Negative  Negative for neck stiffness  Skin: Negative  Allergic/Immunologic: Negative  Neurological: Negative  Negative for dizziness, seizures, speech difficulty, weakness, light-headedness, numbness and headaches  Hematological: Negative  Psychiatric/Behavioral: Negative  All other systems reviewed and are negative  Physical Exam  Physical Exam   Constitutional: She is oriented to person, place, and time  Vital signs are normal  She appears well-developed and well-nourished  She does not have a sickly appearance  She does not appear ill  No distress  HENT:   Right Ear: External ear normal  No swelling  Tympanic membrane is not bulging  Left Ear: External ear normal  No swelling  Tympanic membrane is not bulging     Nose: Nose normal    Mouth/Throat: Oropharyngeal exudate and posterior oropharyngeal erythema present  Tonsillar exudate  Boggy edematous nasal mucosa   Eyes: Pupils are equal, round, and reactive to light  Conjunctivae, EOM and lids are normal    Neck: Normal range of motion  Neck supple  No JVD present  No tracheal deviation, no edema and normal range of motion present  No thyromegaly present  Cardiovascular: Normal rate, regular rhythm, normal heart sounds, intact distal pulses and normal pulses  Exam reveals no gallop and no friction rub  No murmur heard  Pulmonary/Chest: Effort normal and breath sounds normal  No stridor  No respiratory distress  She has no wheezes  She has no rales  She exhibits no tenderness  Abdominal: Soft  Bowel sounds are normal  She exhibits no distension and no mass  There is no tenderness  There is no rebound, no guarding and negative Winchester's sign  No hernia  Musculoskeletal: Normal range of motion  She exhibits no edema or tenderness  Lymphadenopathy:     She has cervical adenopathy  Neurological: She is alert and oriented to person, place, and time  She has normal strength and normal reflexes  No cranial nerve deficit or sensory deficit  GCS eye subscore is 4  GCS verbal subscore is 5  GCS motor subscore is 6  Skin: Skin is warm and dry  Capillary refill takes less than 2 seconds  No rash noted  She is not diaphoretic  No erythema  No pallor  Psychiatric: She has a normal mood and affect  Her speech is normal and behavior is normal    Vitals reviewed        Vital Signs  ED Triage Vitals [11/08/19 0917]   Temperature Pulse Respirations Blood Pressure SpO2   100 5 °F (38 1 °C) (!) 111 19 133/85 98 %      Temp Source Heart Rate Source Patient Position - Orthostatic VS BP Location FiO2 (%)   Temporal Monitor Sitting Right arm --      Pain Score       5           Vitals:    11/08/19 0917   BP: 133/85   Pulse: (!) 111   Patient Position - Orthostatic VS: Sitting         Visual Acuity      ED Medications  Medications   acetaminophen (TYLENOL) tablet 650 mg (650 mg Oral Given 11/8/19 0932)   amoxicillin-clavulanate (AUGMENTIN) 875-125 mg per tablet 1 tablet (1 tablet Oral Given 11/8/19 0933)       Diagnostic Studies  Results Reviewed     None                 No orders to display              Procedures  Procedures       ED Course                               MDM    Disposition  Final diagnoses:   Acute tonsillitis     Time reflects when diagnosis was documented in both MDM as applicable and the Disposition within this note     Time User Action Codes Description Comment    11/8/2019  9:21 AM Satya Gonzalez [M58 [id number]] Acute tonsillitis       ED Disposition     ED Disposition Condition Date/Time Comment    Discharge Stable Fri Nov 8, 2019  9:21 AM Sofie Barreto discharge to home/self care  Follow-up Information     Follow up With Specialties Details Why Contact Anant Cano DO Otolaryngology Schedule an appointment as soon as possible for a visit   [address] [place], [place] [address]rick Beckley  [phone]            Patient's Medications   Discharge Prescriptions    AMOXICILLIN-CLAVULANATE (AUGMENTIN) 875-125 MG PER TABLET    Take 1 tablet by mouth every 12 (twelve) hours for 7 days       Start Date: 11/8/2019 End Date: 11/15/2019       Order Dose: 1 tablet       Quantity: 14 tablet    Refills: 0     No discharge procedures on file      ED Provider  Electronically Signed by           Layla Moon PA-C  11/08/19 7050

## 2019-11-12 ENCOUNTER — APPOINTMENT (EMERGENCY)
Dept: CT IMAGING | Facility: HOSPITAL | Age: 24
End: 2019-11-12

## 2019-11-12 ENCOUNTER — HOSPITAL ENCOUNTER (EMERGENCY)
Facility: HOSPITAL | Age: 24
Discharge: HOME | End: 2019-11-12
Attending: EMERGENCY MEDICINE | Admitting: EMERGENCY MEDICINE
Payer: COMMERCIAL

## 2019-11-12 VITALS
HEART RATE: 85 BPM | BODY MASS INDEX: 38.63 KG/M2 | OXYGEN SATURATION: 99 % | TEMPERATURE: 97.3 F | SYSTOLIC BLOOD PRESSURE: 125 MMHG | DIASTOLIC BLOOD PRESSURE: 75 MMHG | WEIGHT: 204.59 LBS | RESPIRATION RATE: 18 BRPM | HEIGHT: 61 IN

## 2019-11-12 DIAGNOSIS — R09.89 PHLEGM IN THROAT: Primary | ICD-10-CM

## 2019-11-12 DIAGNOSIS — J02.9 PHARYNGITIS: ICD-10-CM

## 2019-11-12 LAB
ALBUMIN SERPL BCP-MCNC: 3.5 G/DL (ref 3.5–5)
ALP SERPL-CCNC: 65 U/L (ref 46–116)
ALT SERPL W P-5'-P-CCNC: 22 U/L (ref 12–78)
ANION GAP SERPL CALCULATED.3IONS-SCNC: 8 MMOL/L (ref 4–13)
AST SERPL W P-5'-P-CCNC: 25 U/L (ref 5–45)
BASOPHILS # BLD MANUAL: 0 THOUSAND/UL (ref 0–0.1)
BASOPHILS NFR MAR MANUAL: 0 % (ref 0–1)
BILIRUB SERPL-MCNC: 0.4 MG/DL (ref 0.2–1)
BUN SERPL-MCNC: 6 MG/DL (ref 5–25)
CALCIUM SERPL-MCNC: 9 MG/DL (ref 8.3–10.1)
CHLORIDE SERPL-SCNC: 100 MMOL/L (ref 100–108)
CO2 SERPL-SCNC: 29 MMOL/L (ref 21–32)
CREAT SERPL-MCNC: 0.64 MG/DL (ref 0.6–1.3)
EOSINOPHIL # BLD MANUAL: 0 THOUSAND/UL (ref 0–0.4)
EOSINOPHIL NFR BLD MANUAL: 0 % (ref 0–6)
ERYTHROCYTE [DISTWIDTH] IN BLOOD BY AUTOMATED COUNT: 13.4 % (ref 11.6–15.1)
EXT PREG TEST URINE: NEGATIVE
EXT. CONTROL ED NAV: NORMAL
GFR SERPL CREATININE-BSD FRML MDRD: 144 ML/MIN/1.73SQ M
GLUCOSE SERPL-MCNC: 97 MG/DL (ref 65–140)
HCT VFR BLD AUTO: 40.5 % (ref 34.8–46.1)
HGB BLD-MCNC: 13.1 G/DL (ref 11.5–15.4)
LYMPHOCYTES # BLD AUTO: 28 % (ref 14–44)
LYMPHOCYTES # BLD AUTO: 3.1 THOUSAND/UL (ref 0.6–4.47)
MCH RBC QN AUTO: 27.3 PG (ref 26.8–34.3)
MCHC RBC AUTO-ENTMCNC: 32.3 G/DL (ref 31.4–37.4)
MCV RBC AUTO: 85 FL (ref 82–98)
MONOCYTES # BLD AUTO: 1.11 THOUSAND/UL (ref 0–1.22)
MONOCYTES NFR BLD: 10 % (ref 4–12)
NEUTROPHILS # BLD MANUAL: 6.87 THOUSAND/UL (ref 1.85–7.62)
NEUTS SEG NFR BLD AUTO: 62 % (ref 43–75)
NRBC BLD AUTO-RTO: 0 /100 WBCS
PLATELET # BLD AUTO: 349 THOUSANDS/UL (ref 149–390)
PLATELET BLD QL SMEAR: ADEQUATE
PMV BLD AUTO: 10 FL (ref 8.9–12.7)
POTASSIUM SERPL-SCNC: 3.2 MMOL/L (ref 3.5–5.3)
PROT SERPL-MCNC: 9.1 G/DL (ref 6.4–8.2)
RBC # BLD AUTO: 4.79 MILLION/UL (ref 3.81–5.12)
S PYO DNA THROAT QL NAA+PROBE: NORMAL
SODIUM SERPL-SCNC: 137 MMOL/L (ref 136–145)
TOTAL CELLS COUNTED SPEC: 100
WBC # BLD AUTO: 11.08 THOUSAND/UL (ref 4.31–10.16)

## 2019-11-12 PROCEDURE — 96365 THER/PROPH/DIAG IV INF INIT: CPT

## 2019-11-12 PROCEDURE — 85007 BL SMEAR W/DIFF WBC COUNT: CPT | Performed by: EMERGENCY MEDICINE

## 2019-11-12 PROCEDURE — 80053 COMPREHEN METABOLIC PANEL: CPT | Performed by: EMERGENCY MEDICINE

## 2019-11-12 PROCEDURE — 96361 HYDRATE IV INFUSION ADD-ON: CPT

## 2019-11-12 PROCEDURE — 85027 COMPLETE CBC AUTOMATED: CPT | Performed by: EMERGENCY MEDICINE

## 2019-11-12 PROCEDURE — 99284 EMERGENCY DEPT VISIT MOD MDM: CPT

## 2019-11-12 PROCEDURE — 99284 EMERGENCY DEPT VISIT MOD MDM: CPT | Performed by: EMERGENCY MEDICINE

## 2019-11-12 PROCEDURE — 86308 HETEROPHILE ANTIBODY SCREEN: CPT | Performed by: EMERGENCY MEDICINE

## 2019-11-12 PROCEDURE — 96375 TX/PRO/DX INJ NEW DRUG ADDON: CPT

## 2019-11-12 PROCEDURE — 70491 CT SOFT TISSUE NECK W/DYE: CPT

## 2019-11-12 PROCEDURE — 87651 STREP A DNA AMP PROBE: CPT | Performed by: EMERGENCY MEDICINE

## 2019-11-12 PROCEDURE — 81025 URINE PREGNANCY TEST: CPT | Performed by: EMERGENCY MEDICINE

## 2019-11-12 RX ORDER — KETOROLAC TROMETHAMINE 30 MG/ML
30 INJECTION, SOLUTION INTRAMUSCULAR; INTRAVENOUS ONCE
Status: COMPLETED | OUTPATIENT
Start: 2019-11-12 | End: 2019-11-12

## 2019-11-12 RX ORDER — ACETAMINOPHEN AND CODEINE PHOSPHATE 300; 30 MG/1; MG/1
1 TABLET ORAL EVERY 8 HOURS PRN
Qty: 6 TABLET | Refills: 0 | Status: SHIPPED | OUTPATIENT
Start: 2019-11-12 | End: 2019-11-12 | Stop reason: SDUPTHER

## 2019-11-12 RX ORDER — ACETAMINOPHEN AND CODEINE PHOSPHATE 300; 30 MG/1; MG/1
1 TABLET ORAL EVERY 8 HOURS PRN
Qty: 6 TABLET | Refills: 0 | Status: SHIPPED | OUTPATIENT
Start: 2019-11-12 | End: 2019-11-14

## 2019-11-12 RX ORDER — CLINDAMYCIN PHOSPHATE 600 MG/50ML
600 INJECTION INTRAVENOUS ONCE
Status: COMPLETED | OUTPATIENT
Start: 2019-11-12 | End: 2019-11-12

## 2019-11-12 RX ORDER — CLINDAMYCIN HYDROCHLORIDE 150 MG/1
300 CAPSULE ORAL EVERY 6 HOURS
Qty: 80 CAPSULE | Refills: 0 | Status: SHIPPED | OUTPATIENT
Start: 2019-11-12 | End: 2019-11-12 | Stop reason: SDUPTHER

## 2019-11-12 RX ORDER — DEXAMETHASONE SODIUM PHOSPHATE 10 MG/ML
10 INJECTION, SOLUTION INTRAMUSCULAR; INTRAVENOUS ONCE
Status: COMPLETED | OUTPATIENT
Start: 2019-11-12 | End: 2019-11-12

## 2019-11-12 RX ORDER — CLINDAMYCIN HYDROCHLORIDE 150 MG/1
300 CAPSULE ORAL EVERY 6 HOURS
Qty: 28 CAPSULE | Refills: 0 | Status: SHIPPED | OUTPATIENT
Start: 2019-11-12 | End: 2019-11-22

## 2019-11-12 RX ADMIN — CLINDAMYCIN PHOSPHATE 600 MG: 600 INJECTION, SOLUTION INTRAVENOUS at 21:17

## 2019-11-12 RX ADMIN — IOHEXOL 100 ML: 350 INJECTION, SOLUTION INTRAVENOUS at 21:49

## 2019-11-12 RX ADMIN — SODIUM CHLORIDE 1000 ML: 0.9 INJECTION, SOLUTION INTRAVENOUS at 21:07

## 2019-11-12 RX ADMIN — DEXAMETHASONE SODIUM PHOSPHATE 10 MG: 10 INJECTION, SOLUTION INTRAMUSCULAR; INTRAVENOUS at 21:13

## 2019-11-12 RX ADMIN — KETOROLAC TROMETHAMINE 30 MG: 30 INJECTION, SOLUTION INTRAMUSCULAR at 21:15

## 2019-11-13 LAB — HETEROPH AB SER QL: NEGATIVE

## 2019-11-13 NOTE — ED PROVIDER NOTES
History  Chief Complaint   Patient presents with    Sore Throat     has been taking ABX for infected tonsils, has been unable to keep ABX down, throat not feeling better, getting hard to swallow     Patient is a 71-year-old female otherwise healthy coming in today complaining of worsening sore throat  She states that she was here approximately 4 5 days ago and was given a prescription for Augmentin which she has been taking  She states that she initially had fevers which have subsequently had decreased  She but she feels that her throat was worsening  She has difficulty swallowing but is able to maintain her secretions  She has no recent travel, sick contacts, recent      History provided by:  Patient   used: No    Sore Throat   Location:  Right  Quality:  Aching, sharp and sore  Severity:  Moderate  Onset quality:  Gradual  Timing:  Constant  Progression:  Worsening  Chronicity:  Recurrent  Relieved by:  Nothing  Worsened by:  Nothing  Ineffective treatments: Augmentin, Motrin  Associated symptoms: adenopathy, fever and trouble swallowing    Associated symptoms: no abdominal pain, no chest pain, no chills, no cough, no drooling, no ear discharge, no ear pain, no epistaxis, no eye discharge, no headaches, no neck stiffness, no night sweats, no plugged ear sensation, no postnasal drip, no rash, no rhinorrhea, no shortness of breath, no sinus congestion, no stridor and no voice change    Risk factors: no exposure to strep, no exposure to mono, no sick contacts, no recent dental procedure, no recent endoscopy and no recent ENT procedure        Prior to Admission Medications   Prescriptions Last Dose Informant Patient Reported? Taking?    Methylprednisolone 4 MG TBPK   No No   Sig: Use as directed on package   Patient not taking: Reported on 11/8/2019   amoxicillin-clavulanate (AUGMENTIN) 875-125 mg per tablet   No No   Sig: Take 1 tablet by mouth every 12 (twelve) hours for 7 days cyclobenzaprine (FLEXERIL) 10 mg tablet   No No   Sig: Take 1 tablet by mouth 3 (three) times a day as needed for muscle spasms for up to 10 doses   Patient not taking: Reported on 11/8/2019   dicyclomine (BENTYL) 20 mg tablet   No No   Sig: Take 1 tablet (20 mg total) by mouth 2 (two) times a day   Patient not taking: Reported on 11/8/2019   ibuprofen (MOTRIN) 800 mg tablet   Yes No   Sig: Take by mouth every 8 (eight) hours as needed for mild pain   naproxen (NAPROSYN) 500 mg tablet   No No   Sig: Take 1 tablet by mouth 2 (two) times a day with meals for 7 days   ondansetron (ZOFRAN-ODT) 4 mg disintegrating tablet   No No   Sig: Take 1 tablet (4 mg total) by mouth every 4 (four) hours as needed for nausea or vomiting   Patient not taking: Reported on 11/8/2019      Facility-Administered Medications: None       History reviewed  No pertinent past medical history  History reviewed  No pertinent surgical history  History reviewed  No pertinent family history  I have reviewed and agree with the history as documented  Social History     Tobacco Use    Smoking status: Never Smoker    Smokeless tobacco: Never Used   Substance Use Topics    Alcohol use: No    Drug use: No        Review of Systems   Constitutional: Positive for fever  Negative for chills, diaphoresis and night sweats  HENT: Positive for sore throat and trouble swallowing  Negative for drooling, ear discharge, ear pain, nosebleeds, postnasal drip, rhinorrhea and voice change  Eyes: Negative  Negative for discharge and visual disturbance  Respiratory: Negative  Negative for cough, chest tightness, shortness of breath and stridor  Cardiovascular: Negative  Negative for chest pain and palpitations  Gastrointestinal: Negative  Negative for abdominal pain, nausea and vomiting  Endocrine: Negative  Genitourinary: Negative  Negative for difficulty urinating and dysuria     Musculoskeletal: Negative for back pain, neck pain and neck stiffness  Skin: Negative for rash  Neurological: Negative  Negative for weakness and headaches  Hematological: Positive for adenopathy  Psychiatric/Behavioral: Negative  Negative for confusion  All other systems reviewed and are negative  Physical Exam  Physical Exam   Constitutional: She is oriented to person, place, and time  She appears well-developed and well-nourished  No distress  HENT:   Head: Normocephalic and atraumatic  Right Ear: Hearing, tympanic membrane, external ear and ear canal normal    Left Ear: Hearing, tympanic membrane, external ear and ear canal normal    Nose: Nose normal    Mouth/Throat: Oropharynx is clear and moist        Eyes: Pupils are equal, round, and reactive to light  Conjunctivae and EOM are normal    Neck: Normal range of motion  Neck supple  Cardiovascular: Normal rate, regular rhythm, normal heart sounds and intact distal pulses  No murmur heard  Pulses:       Radial pulses are 2+ on the right side, and 2+ on the left side  Dorsalis pedis pulses are 2+ on the right side, and 2+ on the left side  Pulmonary/Chest: Effort normal and breath sounds normal  No stridor  No respiratory distress  Abdominal: Soft  Bowel sounds are normal  She exhibits no distension  There is no tenderness  Musculoskeletal: Normal range of motion  She exhibits no edema  Neurological: She is alert and oriented to person, place, and time  No cranial nerve deficit  GCS eye subscore is 4  GCS verbal subscore is 5  GCS motor subscore is 6  No slurred speech  No facial asymmetry  No ataxia  Skin: Skin is warm  Capillary refill takes less than 2 seconds  She is not diaphoretic  Psychiatric: She has a normal mood and affect  Nursing note and vitals reviewed        Vital Signs  ED Triage Vitals [11/12/19 2032]   Temperature Pulse Respirations Blood Pressure SpO2   (!) 97 3 °F (36 3 °C) 94 18 130/60 99 %      Temp Source Heart Rate Source Patient Position - Orthostatic VS BP Location FiO2 (%)   Temporal Monitor Sitting Right arm --      Pain Score       9           Vitals:    11/12/19 2032 11/12/19 2115 11/12/19 2311   BP: 130/60 130/80 125/75   Pulse: 94 84 85   Patient Position - Orthostatic VS: Sitting Sitting Sitting         Visual Acuity      ED Medications  Medications   sodium chloride 0 9 % bolus 1,000 mL (0 mL Intravenous Stopped 11/12/19 2253)   dexamethasone (PF) (DECADRON) injection 10 mg (10 mg Intravenous Given 11/12/19 2113)   ketorolac (TORADOL) injection 30 mg (30 mg Intravenous Given 11/12/19 2115)   clindamycin (CLEOCIN) IVPB (premix) 600 mg (0 mg Intravenous Stopped 11/12/19 2147)   iohexol (OMNIPAQUE) 350 MG/ML injection (MULTI-DOSE) 100 mL (100 mL Intravenous Given 11/12/19 2149)       Diagnostic Studies  Results Reviewed     Procedure Component Value Units Date/Time    Strep A PCR [[id number]]  (Normal) Collected:  11/12/19 2156    Lab Status:  Final result Specimen:  Throat Updated:  11/12/19 2239     STREP A PCR None Detected    Strep A PCR [[id number]]     Lab Status:  No result Specimen:  Throat     CBC and differential [[id number]]  (Abnormal) Collected:  11/12/19 2105    Lab Status:  Final result Specimen:  Blood from Arm, Right Updated:  11/12/19 2134     WBC 11 08 Thousand/uL      RBC 4 79 Million/uL      Hemoglobin 13 1 g/dL      Hematocrit 40 5 %      MCV 85 fL      MCH 27 3 pg      MCHC 32 3 g/dL      RDW 13 4 %      MPV 10 0 fL      Platelets 370 Thousands/uL      nRBC 0 /100 WBCs     Narrative: This is an appended report  These results have been appended to a previously verified report      Comprehensive metabolic panel [[id number]]  (Abnormal) Collected:  11/12/19 2105    Lab Status:  Final result Specimen:  Blood from Arm, Right Updated:  11/12/19 2129     Sodium 137 mmol/L      Potassium 3 2 mmol/L      Chloride 100 mmol/L      CO2 29 mmol/L      ANION GAP 8 mmol/L      BUN 6 mg/dL      Creatinine 0 64 mg/dL      Glucose 97 mg/dL      Calcium 9 0 mg/dL      AST 25 U/L      ALT 22 U/L      Alkaline Phosphatase 65 U/L      Total Protein 9 1 g/dL      Albumin 3 5 g/dL      Total Bilirubin 0 40 mg/dL      eGFR 144 ml/min/1 73sq m     Narrative:       Nolan guidelines for Chronic Kidney Disease (CKD):     Stage 1 with normal or high GFR (GFR > 90 mL/min/1 73 square meters)    Stage 2 Mild CKD (GFR = 60-89 mL/min/1 73 square meters)    Stage 3A Moderate CKD (GFR = 45-59 mL/min/1 73 square meters)    Stage 3B Moderate CKD (GFR = 30-44 mL/min/1 73 square meters)    Stage 4 Severe CKD (GFR = 15-29 mL/min/1 73 square meters)    Stage 5 End Stage CKD (GFR <15 mL/min/1 73 square meters)  Note: GFR calculation is accurate only with a steady state creatinine    Mononucleosis screen [[id number]] Collected:  11/12/19 2105    Lab Status: In process Specimen:  Blood from Arm, Right Updated:  11/12/19 2111    POCT pregnancy, urine [[id number]]  (Normal) Resulted:  11/12/19 2104    Lab Status:  Final result Updated:  11/12/19 2104     EXT PREG TEST UR (Ref: Negative) Negative     Control Valid                 CT soft tissue neck with contrast   Final Result by Melonie Haywood DO (11/12 2242)       large phlegmon in the right palantine tonsil  with narrowing of the airway  Workstation performed: [id number]                    Procedures  Procedures       ED Course  ED Course as of Nov 12 2336 Tue Nov 12, 2019 2059 Patient is a 63-year-old female coming in today with worsening sore throat  On exam patient is maintaining airway and secretions but is concerning for peritonsillar abscess  Will start with IV fluids, labs, Decadron, Toradol, CTA as well as clindamycin  She is agreeable plan of care  Portions of the record may have been created with voice recognition software  Occasional wrong word or "sound a like" substitutions may have occurred due to the inherent limitations of voice recognition software   Read the chart carefully and recognize, using context, where substitutions have occurred  2156 Patient's labs are stable no evidence of end-organ damage  Pending CT results as well as strep  2206 Patient updated on labs  She is resting in bed and feels much more comfortable  2242 Strep test negative  Pending mono  2250 Patient ct with large phlegmon and narrowing of the airway  Patient maintaining airway and secretions  ENT on call for 's       2257 No one on call  Long discussion with patient and she feels well  She has no sob and her throat feels better  Wishes to go home  MDM    Disposition  Final diagnoses:   Phlegm in throat   Pharyngitis     Time reflects when diagnosis was documented in both MDM as applicable and the Disposition within this note     Time User Action Codes Description Comment    11/12/2019 10:59 PM Ana Gonzalez Add [R09 89] Phlegm in throat     11/12/2019 11:00 PM Jason Chin Add [J02 9] Pharyngitis       ED Disposition     ED Disposition Condition Date/Time Comment    Discharge Stable Tue Nov 12, 2019 [address] discharge to home/self care              Follow-up Information     Follow up With Specialties Details Why Contact Info Additional [address] Otolaryngology Schedule an appointment as soon as possible for a visit in 1 week  [facility] [address], [address] [place] [place], [place] [address]    [facility] Otolaryngology Schedule an appointment as soon as possible for a visit in 1 week  [address] [facility] [address]  [facility], [address] [place] [address], [address] [phone]          Discharge Medication List as of 11/12/2019 11:20 PM      START taking these medications    Details   acetaminophen-codeine (TYLENOL #3) 300-30 mg per tablet Take 1 tablet by mouth every 8 (eight) hours as needed for moderate pain for up to 2 days, Starting Tue 11/12/2019, Until Thu 11/14/2019, Normal      clindamycin (CLEOCIN) 150 mg capsule Take 2 capsules (300 mg total) by mouth every 6 (six) hours for 10 days, Starting Tue 11/12/2019, Until Fri 11/22/2019, Normal         CONTINUE these medications which have NOT CHANGED    Details   amoxicillin-clavulanate (AUGMENTIN) 875-125 mg per tablet Take 1 tablet by mouth every 12 (twelve) hours for 7 days, Starting Fri 11/8/2019, Until Fri 11/15/2019, Print      cyclobenzaprine (FLEXERIL) 10 mg tablet Take 1 tablet by mouth 3 (three) times a day as needed for muscle spasms for up to 10 doses, Starting Thu 9/21/2017, Print      dicyclomine (BENTYL) 20 mg tablet Take 1 tablet (20 mg total) by mouth 2 (two) times a day, Starting Mon 12/17/2018, Normal      ibuprofen (MOTRIN) 800 mg tablet Take by mouth every 8 (eight) hours as needed for mild pain, Historical Med      Methylprednisolone 4 MG TBPK Use as directed on package, Print      naproxen (NAPROSYN) 500 mg tablet Take 1 tablet by mouth 2 (two) times a day with meals for 7 days, Starting Thu 9/21/2017, Until Thu 9/28/2017, Print      ondansetron (ZOFRAN-ODT) 4 mg disintegrating tablet Take 1 tablet (4 mg total) by mouth every 4 (four) hours as needed for nausea or vomiting, Starting Mon 12/17/2018, Normal           No discharge procedures on file      ED Provider  Electronically Signed by           Diego Salguero DO  11/12/19 6838

## 2019-11-13 NOTE — DISCHARGE INSTRUCTIONS
STOP AUGMENTIN AND CONTINUE THE CLINDAMYCIN  WHILE TAKING ANTIBIOTICS MAKE SURE YOU TAKE ACIDOPHILUS, LACTOBACILLUS, OR EAT A YOGURT TO HELP PREVENT YEAST INFECTION  YOUR PRESCRIPTIONS WERE SENT TO THE PHARMACY    YOU MUST CALL EAR NOSE AND THROAT TOMORROW FOR FOLLOW-UP  AS DISCUSSED, IF YOU HAVE WORSENING SORE THROAT, DIFFICULTY SWALLOWING OR NEW ONSET SHORTNESS OF BREATH FEVERS OR ANY OTHER CONCERN RETURN TO THE ER

## 2020-03-18 ENCOUNTER — APPOINTMENT (EMERGENCY)
Dept: ULTRASOUND IMAGING | Facility: HOSPITAL | Age: 25
End: 2020-03-18
Payer: COMMERCIAL

## 2020-03-18 ENCOUNTER — HOSPITAL ENCOUNTER (EMERGENCY)
Facility: HOSPITAL | Age: 25
Discharge: HOME | End: 2020-03-18
Attending: EMERGENCY MEDICINE
Payer: COMMERCIAL

## 2020-03-18 VITALS
BODY MASS INDEX: 37.57 KG/M2 | TEMPERATURE: 99.1 F | SYSTOLIC BLOOD PRESSURE: 100 MMHG | DIASTOLIC BLOOD PRESSURE: 51 MMHG | WEIGHT: 204.15 LBS | OXYGEN SATURATION: 98 % | HEART RATE: 70 BPM | HEIGHT: 62 IN | RESPIRATION RATE: 14 BRPM

## 2020-03-18 DIAGNOSIS — O41.8X10 SUBCHORIONIC HEMORRHAGE IN FIRST TRIMESTER: ICD-10-CM

## 2020-03-18 DIAGNOSIS — Z3A.01 LESS THAN 8 WEEKS GESTATION OF PREGNANCY: Primary | ICD-10-CM

## 2020-03-18 DIAGNOSIS — R11.2 NAUSEA & VOMITING: ICD-10-CM

## 2020-03-18 DIAGNOSIS — O46.8X1 SUBCHORIONIC HEMORRHAGE IN FIRST TRIMESTER: ICD-10-CM

## 2020-03-18 LAB
ALBUMIN SERPL BCP-MCNC: 3.9 G/DL (ref 3.5–5)
ALP SERPL-CCNC: 51 U/L (ref 46–116)
ALT SERPL W P-5'-P-CCNC: 22 U/L (ref 12–78)
ANION GAP SERPL CALCULATED.3IONS-SCNC: 10 MMOL/L (ref 4–13)
AST SERPL W P-5'-P-CCNC: 14 U/L (ref 5–45)
B-HCG SERPL-ACNC: ABNORMAL MIU/ML
BACTERIA UR QL AUTO: ABNORMAL /HPF
BASOPHILS # BLD AUTO: 0.03 THOUSANDS/ΜL (ref 0–0.1)
BASOPHILS NFR BLD AUTO: 0 % (ref 0–1)
BILIRUB SERPL-MCNC: 0.6 MG/DL (ref 0.2–1)
BILIRUB UR QL STRIP: NEGATIVE
BUN SERPL-MCNC: 7 MG/DL (ref 5–25)
CALCIUM SERPL-MCNC: 9.2 MG/DL (ref 8.3–10.1)
CHLORIDE SERPL-SCNC: 102 MMOL/L (ref 100–108)
CLARITY UR: ABNORMAL
CO2 SERPL-SCNC: 26 MMOL/L (ref 21–32)
COLOR UR: YELLOW
CREAT SERPL-MCNC: 0.6 MG/DL (ref 0.6–1.3)
EOSINOPHIL # BLD AUTO: 0.03 THOUSAND/ΜL (ref 0–0.61)
EOSINOPHIL NFR BLD AUTO: 0 % (ref 0–6)
ERYTHROCYTE [DISTWIDTH] IN BLOOD BY AUTOMATED COUNT: 13.3 % (ref 11.6–15.1)
EXT PREG TEST URINE: POSITIVE
EXT. CONTROL ED NAV: ABNORMAL
GFR SERPL CREATININE-BSD FRML MDRD: 148 ML/MIN/1.73SQ M
GLUCOSE SERPL-MCNC: 90 MG/DL (ref 65–140)
GLUCOSE UR STRIP-MCNC: NEGATIVE MG/DL
HCT VFR BLD AUTO: 40.2 % (ref 34.8–46.1)
HGB BLD-MCNC: 12.9 G/DL (ref 11.5–15.4)
HGB UR QL STRIP.AUTO: NEGATIVE
IMM GRANULOCYTES # BLD AUTO: 0.03 THOUSAND/UL (ref 0–0.2)
IMM GRANULOCYTES NFR BLD AUTO: 0 % (ref 0–2)
KETONES UR STRIP-MCNC: NEGATIVE MG/DL
LEUKOCYTE ESTERASE UR QL STRIP: ABNORMAL
LIPASE SERPL-CCNC: 79 U/L (ref 73–393)
LYMPHOCYTES # BLD AUTO: 2.77 THOUSANDS/ΜL (ref 0.6–4.47)
LYMPHOCYTES NFR BLD AUTO: 33 % (ref 14–44)
MAGNESIUM SERPL-MCNC: 1.6 MG/DL (ref 1.6–2.6)
MCH RBC QN AUTO: 27.6 PG (ref 26.8–34.3)
MCHC RBC AUTO-ENTMCNC: 32.1 G/DL (ref 31.4–37.4)
MCV RBC AUTO: 86 FL (ref 82–98)
MONOCYTES # BLD AUTO: 0.71 THOUSAND/ΜL (ref 0.17–1.22)
MONOCYTES NFR BLD AUTO: 8 % (ref 4–12)
MUCOUS THREADS UR QL AUTO: ABNORMAL
NEUTROPHILS # BLD AUTO: 4.88 THOUSANDS/ΜL (ref 1.85–7.62)
NEUTS SEG NFR BLD AUTO: 59 % (ref 43–75)
NITRITE UR QL STRIP: NEGATIVE
NON-SQ EPI CELLS URNS QL MICRO: ABNORMAL /HPF
NRBC BLD AUTO-RTO: 0 /100 WBCS
PH UR STRIP.AUTO: 6 [PH]
PLATELET # BLD AUTO: 279 THOUSANDS/UL (ref 149–390)
PMV BLD AUTO: 10.4 FL (ref 8.9–12.7)
POTASSIUM SERPL-SCNC: 3.9 MMOL/L (ref 3.5–5.3)
PROT SERPL-MCNC: 8.3 G/DL (ref 6.4–8.2)
PROT UR STRIP-MCNC: NEGATIVE MG/DL
RBC # BLD AUTO: 4.68 MILLION/UL (ref 3.81–5.12)
RBC #/AREA URNS AUTO: ABNORMAL /HPF
SODIUM SERPL-SCNC: 138 MMOL/L (ref 136–145)
SP GR UR STRIP.AUTO: 1.02 (ref 1–1.03)
UROBILINOGEN UR QL STRIP.AUTO: 0.2 E.U./DL
WBC # BLD AUTO: 8.45 THOUSAND/UL (ref 4.31–10.16)
WBC #/AREA URNS AUTO: ABNORMAL /HPF

## 2020-03-18 PROCEDURE — 99284 EMERGENCY DEPT VISIT MOD MDM: CPT

## 2020-03-18 PROCEDURE — 81001 URINALYSIS AUTO W/SCOPE: CPT | Performed by: PHYSICIAN ASSISTANT

## 2020-03-18 PROCEDURE — 85025 COMPLETE CBC W/AUTO DIFF WBC: CPT | Performed by: PHYSICIAN ASSISTANT

## 2020-03-18 PROCEDURE — 83690 ASSAY OF LIPASE: CPT | Performed by: PHYSICIAN ASSISTANT

## 2020-03-18 PROCEDURE — 96361 HYDRATE IV INFUSION ADD-ON: CPT

## 2020-03-18 PROCEDURE — 83735 ASSAY OF MAGNESIUM: CPT | Performed by: PHYSICIAN ASSISTANT

## 2020-03-18 PROCEDURE — 76801 OB US < 14 WKS SINGLE FETUS: CPT

## 2020-03-18 PROCEDURE — 36415 COLL VENOUS BLD VENIPUNCTURE: CPT | Performed by: PHYSICIAN ASSISTANT

## 2020-03-18 PROCEDURE — 80053 COMPREHEN METABOLIC PANEL: CPT | Performed by: PHYSICIAN ASSISTANT

## 2020-03-18 PROCEDURE — 84702 CHORIONIC GONADOTROPIN TEST: CPT | Performed by: PHYSICIAN ASSISTANT

## 2020-03-18 PROCEDURE — 99284 EMERGENCY DEPT VISIT MOD MDM: CPT | Performed by: PHYSICIAN ASSISTANT

## 2020-03-18 PROCEDURE — 81025 URINE PREGNANCY TEST: CPT | Performed by: PHYSICIAN ASSISTANT

## 2020-03-18 PROCEDURE — 96374 THER/PROPH/DIAG INJ IV PUSH: CPT

## 2020-03-18 RX ORDER — FAMOTIDINE 20 MG
1 TABLET ORAL DAILY
Qty: 30 EACH | Refills: 0 | Status: SHIPPED | OUTPATIENT
Start: 2020-03-18 | End: 2021-01-22 | Stop reason: ALTCHOICE

## 2020-03-18 RX ORDER — ONDANSETRON 2 MG/ML
4 INJECTION INTRAMUSCULAR; INTRAVENOUS ONCE
Status: COMPLETED | OUTPATIENT
Start: 2020-03-18 | End: 2020-03-18

## 2020-03-18 RX ORDER — DOXYLAMINE SUCCINATE AND PYRIDOXINE HYDROCHLORIDE, DELAYED RELEASE TABLETS 10 MG/10 MG 10; 10 MG/1; MG/1
2 TABLET, DELAYED RELEASE ORAL
Qty: 30 TABLET | Refills: 0 | Status: SHIPPED | OUTPATIENT
Start: 2020-03-18 | End: 2021-01-22 | Stop reason: ALTCHOICE

## 2020-03-18 RX ADMIN — ONDANSETRON 4 MG: 2 INJECTION INTRAMUSCULAR; INTRAVENOUS at 10:39

## 2020-03-18 RX ADMIN — SODIUM CHLORIDE 1000 ML: 0.9 INJECTION, SOLUTION INTRAVENOUS at 10:39

## 2020-03-18 NOTE — CASE MANAGEMENT
I self referred the patient to discuss resources with her  The patient asked about a PCP I provided her with a local PCP list     The patient lives with her mother in a 3rd floor apartment  There is [address]  The patient has no DME  She does not receive meals on wheels or home health services at this time  She is independent with her ADL's  She does not drive her mother drives her to where she needs to go  She uses [facility] in [facility]  She has no trouble getting or paying for her medications  She has Medical assistance

## 2020-03-18 NOTE — DISCHARGE INSTRUCTIONS
Rest, plenty of fluids  Start prenatal vitamin daily  Diclegis at bedtime for nausea/vomiting  Follow up with OB/GYN or return to ER as needed

## 2020-03-18 NOTE — ED PROVIDER NOTES
History  Chief Complaint   Patient presents with    Vomiting     Intermittent vomiting and abdominal pain for one month     25year old female presents ambulatory from home for evaluation of nausea, vomiting and abdominal pain  She notes symptoms started last month  She notes she had 2 days of nausea, vomiting last month  She thought she had a stomach bug  She notes symptoms then improved  However, intermittently she's had a few episodes of vomiting but mainly persistent nausea  She reports getting intermittent pain in her upper abdomen as well  Symptoms worse with eating  She has been able to tolerate liquids  No specific alleviating factors  No treatments tried  Denies diarrhea, constipation  Denies any urinary complaints  Denies fever, chills  Denies vaginal bleeding or discharge  She denies pregnancy however notes her LMP was the beginning of February  She is A1  She is unsure if she could be pregnant  No current birth control  Denies sick contacts  Denies recent illness  Denies recent travel  PMH unremarkable  No prior surgeries reported  Denies any current daily medications  History provided by:  Patient   used: No    Vomiting   Timing:  Intermittent  Quality:  Bilious material  Progression:  Partially resolved  Chronicity:  New  Relieved by:  Nothing  Exacerbated by: eating  Ineffective treatments:  None tried  Associated symptoms: abdominal pain    Associated symptoms: no chills, no cough, no diarrhea, no fever, no headaches, no myalgias and no sore throat    Risk factors: no prior abdominal surgery, no sick contacts, no suspect food intake and no travel to endemic areas        Prior to Admission Medications   Prescriptions Last Dose Informant Patient Reported? Taking?    Methylprednisolone 4 MG TBPK   No No   Sig: Use as directed on package   Patient not taking: Reported on 2019   cyclobenzaprine (FLEXERIL) 10 mg tablet   No No   Sig: Take 1 tablet by mouth 3 (three) times a day as needed for muscle spasms for up to 10 doses   Patient not taking: Reported on 11/8/2019   dicyclomine (BENTYL) 20 mg tablet   No No   Sig: Take 1 tablet (20 mg total) by mouth 2 (two) times a day   Patient not taking: Reported on 11/8/2019   ibuprofen (MOTRIN) 800 mg tablet   Yes No   Sig: Take by mouth every 8 (eight) hours as needed for mild pain   naproxen (NAPROSYN) 500 mg tablet   No No   Sig: Take 1 tablet by mouth 2 (two) times a day with meals for 7 days   ondansetron (ZOFRAN-ODT) 4 mg disintegrating tablet   No No   Sig: Take 1 tablet (4 mg total) by mouth every 4 (four) hours as needed for nausea or vomiting   Patient not taking: Reported on 11/8/2019      Facility-Administered Medications: None       History reviewed  No pertinent past medical history  History reviewed  No pertinent surgical history  History reviewed  No pertinent family history  I have reviewed and agree with the history as documented  E-Cigarette/Vaping     E-Cigarette/Vaping Substances     Social History     Tobacco Use    Smoking status: Never Smoker    Smokeless tobacco: Never Used   Substance Use Topics    Alcohol use: No    Drug use: No       Review of Systems   Constitutional: Negative  Negative for chills, fatigue and fever  HENT: Negative  Negative for congestion, rhinorrhea and sore throat  Eyes: Negative  Negative for visual disturbance  Respiratory: Negative  Negative for cough, shortness of breath and wheezing  Cardiovascular: Negative  Negative for chest pain, palpitations and leg swelling  Gastrointestinal: Positive for abdominal pain, nausea and vomiting  Negative for constipation and diarrhea  Genitourinary: Negative  Negative for dysuria, flank pain, frequency, hematuria, pelvic pain, vaginal bleeding and vaginal discharge  Musculoskeletal: Negative  Negative for back pain and myalgias  Skin: Negative  Negative for rash  Neurological: Negative  Negative for dizziness, light-headedness and headaches  Psychiatric/Behavioral: Negative  Negative for confusion  All other systems reviewed and are negative  Physical Exam  Physical Exam   Constitutional: She is oriented to person, place, and time  She appears well-developed and well-nourished  Non-toxic appearance  No distress  HENT:   Head: Normocephalic and atraumatic  Right Ear: Hearing, tympanic membrane, external ear and ear canal normal    Left Ear: Hearing, tympanic membrane, external ear and ear canal normal    Nose: Nose normal    Mouth/Throat: Uvula is midline, oropharynx is clear and moist and mucous membranes are normal  No oropharyngeal exudate  Eyes: Pupils are equal, round, and reactive to light  Conjunctivae and EOM are normal  No scleral icterus  Neck: Trachea normal and normal range of motion  Neck supple  No tracheal deviation present  Cardiovascular: Normal rate, regular rhythm, normal heart sounds, intact distal pulses and normal pulses  No murmur heard  Pulmonary/Chest: Effort normal and breath sounds normal  No tachypnea  No respiratory distress  She has no wheezes  She has no rhonchi  She has no rales  Abdominal: Soft  Bowel sounds are normal  She exhibits no distension  There is no hepatosplenomegaly  There is tenderness (mild) in the suprapubic area  There is no rigidity, no rebound, no guarding and no CVA tenderness  No hernia  Genitourinary:   Genitourinary Comments: Pt deferred   Musculoskeletal: Normal range of motion  She exhibits no edema or tenderness  Neurological: She is alert and oriented to person, place, and time  No cranial nerve deficit or sensory deficit  She exhibits normal muscle tone  Gait normal  GCS eye subscore is 4  GCS verbal subscore is 5  GCS motor subscore is 6  Skin: Skin is warm and dry  Capillary refill takes less than 2 seconds  No rash noted  Psychiatric: She has a normal mood and affect   Her speech is normal and behavior is normal    Nursing note and vitals reviewed        Vital Signs  ED Triage Vitals [03/18/20 1025]   Temperature Pulse Respirations Blood Pressure SpO2   99 1 °F (37 3 °C) 95 16 110/68 100 %      Temp Source Heart Rate Source Patient Position - Orthostatic VS BP Location FiO2 (%)   Oral Monitor Sitting Right arm --      Pain Score       5           Vitals:    03/18/20 1025 03/18/20 1230   BP: 110/68 100/51   Pulse: 95 70   Patient Position - Orthostatic VS: Sitting          Visual Acuity      ED Medications  Medications   ondansetron (ZOFRAN) injection 4 mg (4 mg Intravenous Given 3/18/20 1039)   sodium chloride 0 9 % bolus 1,000 mL (0 mL Intravenous Stopped 3/18/20 1139)       Diagnostic Studies  Results Reviewed     Procedure Component Value Units Date/Time    Quantitative hCG [[id number]]  (Abnormal) Collected:  03/18/20 1042    Lab Status:  Final result Specimen:  Blood from Arm, Left Updated:  03/18/20 1128     HCG, Quant 71,766 mIU/mL     Narrative:        Expected Ranges:     Approximate               Approximate HCG  Gestation age          Concentration ( mIU/mL)  _____________          ______________________   Romeo Gulling                      HCG values  0 2-1                       5-50  1-2                           2-3                         100-5000  3-4                         500-12801  4-5                         1000-12686  5-6                         [id number]  6-8                         [id number]  8-12                        [id number]      Comprehensive metabolic panel [[id number]]  (Abnormal) Collected:  03/18/20 1042    Lab Status:  Final result Specimen:  Blood from Arm, Left Updated:  03/18/20 1105     Sodium 138 mmol/L      Potassium 3 9 mmol/L      Chloride 102 mmol/L      CO2 26 mmol/L      ANION GAP 10 mmol/L      BUN 7 mg/dL      Creatinine 0 60 mg/dL      Glucose 90 mg/dL      Calcium 9 2 mg/dL      AST 14 U/L      ALT 22 U/L      Alkaline Phosphatase 51 U/L      Total Protein 8 3 g/dL      Albumin 3 9 g/dL      Total Bilirubin 0 60 mg/dL      eGFR 148 ml/min/1 73sq m     Narrative:       [place] guidelines for Chronic Kidney Disease (CKD):     Stage 1 with normal or high GFR (GFR > 90 mL/min/1 73 square meters)    Stage 2 Mild CKD (GFR = 60-89 mL/min/1 73 square meters)    Stage 3A Moderate CKD (GFR = 45-59 mL/min/1 73 square meters)    Stage 3B Moderate CKD (GFR = 30-44 mL/min/1 73 square meters)    Stage 4 Severe CKD (GFR = 15-29 mL/min/1 73 square meters)    Stage 5 End Stage CKD (GFR <15 mL/min/1 73 square meters)  Note: GFR calculation is accurate only with a steady state creatinine    Urine Microscopic [[id number]]  (Abnormal) Collected:  03/18/20 1035    Lab Status:  Final result Specimen:  Urine, Clean Catch Updated:  03/18/20 1104     RBC, UA None Seen /hpf      WBC, UA 2-4 /hpf      Epithelial Cells Occasional /hpf      Bacteria, UA Occasional /hpf      MUCUS THREADS Occasional    Magnesium [[id number]]  (Normal) Collected:  03/18/20 1042    Lab Status:  Final result Specimen:  Blood from Arm, Left Updated:  03/18/20 1103     Magnesium 1 6 mg/dL     Lipase [[id number]]  (Normal) Collected:  03/18/20 1042    Lab Status:  Final result Specimen:  Blood from Arm, Left Updated:  03/18/20 1103     Lipase 79 u/L     UA w Reflex to Microscopic w Reflex to Culture [[id number]]  (Abnormal) Collected:  03/18/20 1035    Lab Status:  Final result Specimen:  Urine, Clean Catch Updated:  03/18/20 1052     Color, UA Yellow     Clarity, UA Slightly Cloudy     Specific Gravity, UA 1 025     pH, UA 6 0     Leukocytes, UA Trace     Nitrite, UA Negative     Protein, UA Negative mg/dl      Glucose, UA Negative mg/dl      Ketones, UA Negative mg/dl      Urobilinogen, UA 0 2 E U /dl      Bilirubin, UA Negative     Blood, UA Negative    CBC and differential [[id number]] Collected:  03/18/20 1042    Lab Status:  Final result Specimen:  Blood from Arm, Left Updated:  03/18/20 1049 WBC 8 45 Thousand/uL      RBC 4 68 Million/uL      Hemoglobin 12 9 g/dL      Hematocrit 40 2 %      MCV 86 fL      MCH 27 6 pg      MCHC 32 1 g/dL      RDW 13 3 %      MPV 10 4 fL      Platelets 243 Thousands/uL      nRBC 0 /100 WBCs      Neutrophils Relative 59 %      Immat GRANS % 0 %      Lymphocytes Relative 33 %      Monocytes Relative 8 %      Eosinophils Relative 0 %      Basophils Relative 0 %      Neutrophils Absolute 4 88 Thousands/µL      Immature Grans Absolute 0 03 Thousand/uL      Lymphocytes Absolute 2 77 Thousands/µL      Monocytes Absolute 0 71 Thousand/µL      Eosinophils Absolute 0 03 Thousand/µL      Basophils Absolute 0 03 Thousands/µL     POCT pregnancy, urine [[id number]]  (Abnormal) Resulted:  03/18/20 1035    Lab Status:  Final result Updated:  03/18/20 1035     EXT PREG TEST UR (Ref: Negative) positive     Control valid                 US OB < 14 weeks with transvaginal   Final Result by Fahad Ruvalcaba MD (03/18 1239)      Single live intrauterine gestation at 7 weeks 4 days (range +/- 4 days)  Small subchorionic hemorrhage present  Trace free fluid in the pelvis  ART of 10/31/2020  Workstation performed: [id number]                    Procedures  Procedures         ED Course  ED Course as of Mar 18 1315   Wed Mar 18, 2020   1037 PREGNANCY TEST URINE: positive   1040 Pt updated on positive urine preg  Will obtain hcg in addition to her labs and proceed with transvaginal US to confirm IUP given her abdominal pain        1050 WBC: 8 45   1050 Hemoglobin: 12 9   1050 Platelet Count: 165   1053 Color, UA: Yellow   1053 Clarity, UA: Slightly Cloudy   1053 SL AMB SPECIFIC GRAVITY_URINE: 1 025   1053 pH, UA: 6 0   1053 Leukocytes, UA(!): Trace   1053 Nitrite, UA: Negative   1053 POCT URINE PROTEIN: Negative   1053 Glucose, UA: Negative   1053 Ketones, UA: Negative   1053 SL AMB POCT UROBILINOGEN: 0 2   1053 Bilirubin, UA: Negative   1053 Blood, UA: Negative   1108 Glucose, Random: 90   1108 Creatinine: 0 60   1108 BUN: 7   1108 Sodium: 138   1108 Potassium: 3 9   1108 Chloride: 102   1108 CO2: 26   1108 Anion Gap: 10   1108 Calcium: 9 2   1108 AST: 14   1108 ALT: 22   1108 Alkaline Phosphatase: 51   1108 Total Protein(!): 8 3   1108 Albumin: 3 9   1108 TOTAL BILIRUBIN: 0 60   1108 eGFR: 148   1108 Magnesium: 1 6   1108 Lipase: 79   1108 RBC, UA: None Seen   1109 WBC, UA(!): 2-4   1109 Epithelial Cells: Occasional   1109 Bacteria, UA: Occasional   1109 MUCUS THREADS(!): Occasional   1129 HCG QUANTITATIVE(!): 71,766   1158 US performed and pending interpretation  Pt updated on results thus far  She is feeling improved  [address] results  1245 IMPRESSION:     Single live intrauterine gestation at 7 weeks 4 days (range +/- 4 days)  Small subchorionic hemorrhage present  Trace free fluid in the pelvis      ART of 10/31/2020  US OB < 14 weeks with transvaginal     Pt afebrile and hemodynamically stable  She is tolerating PO  Findings reviewed with pt and mother  She is shocked/upset about the pregnancy  Reassurance given and resources provided  Advised rest, fluids  Will Rx diclegis for nausea/vomiting  Recommended prenatal vitamin daily and outpatient follow up with OB/GYN  Strict return precautions outlined  Advised outpatient follow up with OB/GYN or return to ER for change in condition as outlined  Pt verbalized understanding and had no further questions                                MDM  Number of Diagnoses or Management Options  Less than 8 weeks gestation of pregnancy: new and requires workup  Nausea & vomiting: new and requires workup  Subchorionic hemorrhage in first trimester: new and does not require workup     Amount and/or Complexity of Data Reviewed  Clinical lab tests: ordered and reviewed  Tests in the radiology section of CPT®: ordered and reviewed  Decide to obtain previous medical records or to obtain history from someone other than the patient: yes  Obtain history from someone other than the patient: yes  Review and summarize past medical records: yes  Independent visualization of images, tracings, or specimens: yes    Patient Progress  Patient progress: improved        Disposition  Final diagnoses:   Less than 8 weeks gestation of pregnancy   Nausea & vomiting   Subchorionic hemorrhage in first trimester     Time reflects when diagnosis was documented in both MDM as applicable and the Disposition within this note     Time User Action Codes Description Comment    3/18/2020 12:46 PM Frannie Rutherford Add [Z3A 01] Less than 8 weeks gestation of pregnancy     3/18/2020 12:46 PM Frannie Rutherford Add [R11 2] Nausea & vomiting     3/18/2020 12:54 PM Frannie Rutherford Add [O41 8X10,  O46 8X1] Subchorionic hemorrhage in first trimester       ED Disposition     ED Disposition Condition Date/Time Comment    Discharge Stable Wed Mar 18, 2020 12:46 PM Sherren Allen discharge to home/self care              Follow-up Information     Follow up With Specialties Details Why Contact Info Additional [address] [facility] Obstetrics and Gynecology Schedule an appointment as soon as possible for a visit   [address] [place] [phone]  V [facility] [address] [place], [place], [address] [place] [address] Dr [facility] Emergency Department Emergency Medicine  As needed [address] [facility] ED, [address], [address]          Discharge Medication List as of 3/18/2020 12:56 PM      START taking these medications    Details   Doxylamine-Pyridoxine (Diclegis) 10-10 MG TBEC Take 2 tablets by mouth daily at bedtime, Starting Wed 3/18/2020, Normal      Prenatal Vit-Fe Fumarate-FA (PRENATAL COMPLETE) 14-0 4 MG TABS Take 1 tablet by mouth daily, Starting Wed 3/18/2020, Normal CONTINUE these medications which have NOT CHANGED    Details   cyclobenzaprine (FLEXERIL) 10 mg tablet Take 1 tablet by mouth 3 (three) times a day as needed for muscle spasms for up to 10 doses, Starting Thu 9/21/2017, Print      dicyclomine (BENTYL) 20 mg tablet Take 1 tablet (20 mg total) by mouth 2 (two) times a day, Starting Mon 12/17/2018, Normal      ibuprofen (MOTRIN) 800 mg tablet Take by mouth every 8 (eight) hours as needed for mild pain, Historical Med      Methylprednisolone 4 MG TBPK Use as directed on package, Print      naproxen (NAPROSYN) 500 mg tablet Take 1 tablet by mouth 2 (two) times a day with meals for 7 days, Starting Thu 9/21/2017, Until Thu 9/28/2017, Print      ondansetron (ZOFRAN-ODT) 4 mg disintegrating tablet Take 1 tablet (4 mg total) by mouth every 4 (four) hours as needed for nausea or vomiting, Starting Mon 12/17/2018, Normal           No discharge procedures on file      PDMP Review     None          ED Provider  Electronically Signed by           Tigist Johnston PA-C  03/18/20 6108

## 2020-04-07 ENCOUNTER — PATIENT OUTREACH (OUTPATIENT)
Dept: CASE MANAGEMENT | Facility: OTHER | Age: 25
End: 2020-04-07

## 2021-01-22 ENCOUNTER — OFFICE VISIT (OUTPATIENT)
Dept: FAMILY MEDICINE CLINIC | Facility: CLINIC | Age: 26
End: 2021-01-22
Payer: COMMERCIAL

## 2021-01-22 VITALS
DIASTOLIC BLOOD PRESSURE: 82 MMHG | TEMPERATURE: 96 F | HEIGHT: 62 IN | BODY MASS INDEX: 34.41 KG/M2 | SYSTOLIC BLOOD PRESSURE: 142 MMHG | WEIGHT: 187 LBS

## 2021-01-22 DIAGNOSIS — Z00.00 ANNUAL PHYSICAL EXAM: Primary | ICD-10-CM

## 2021-01-22 PROCEDURE — 99385 PREV VISIT NEW AGE 18-39: CPT | Performed by: PHYSICIAN ASSISTANT

## 2021-01-22 NOTE — PATIENT INSTRUCTIONS

## 2021-01-22 NOTE — PROGRESS NOTES
[address]    NAME: Bala George  AGE: 22 y o  SEX: female  : 1995     DATE: 2021     Assessment and Plan:     Problem List Items Addressed This Visit     None      Visit Diagnoses     Annual physical exam    -  Primary        Gave patient information to schedule with GYN  She is not interested in routine labs at this time  Refused flu shot    Immunizations and preventive care screenings were discussed with patient today  Appropriate education was printed on patient's after visit summary  Counseling:  Dental Health: discussed importance of regular tooth brushing, flossing, and dental visits  Sexual health: discussed sexually transmitted diseases, partner selection, use of condoms, avoidance of unintended pregnancy, and contraceptive alternatives  · Exercise: the importance of regular exercise/physical activity was discussed  Recommend exercise 3-5 times per week for at least 30 minutes  Return in 1 year (on 2022) for Annual physical      Chief Complaint:     Chief Complaint   Patient presents with    Establish Care      History of Present Illness:     Adult Annual Physical   Patient here for a comprehensive physical exam  The patient reports no problems  She is here to establish care and would like a 's physical  She does not have any chronic conditions  She is due for an eye exam, dental cleaning, and GYN visit  She has not been to the GYN since she gave birth to her son 4 years ago  She is interested in getting reestablished with one  Diet and Physical Activity  · Diet/Nutrition: well balanced diet  · Exercise: walking        Depression Screening  PHQ-9 Depression Screening    PHQ-9:   Frequency of the following problems over the past two weeks:      Little interest or pleasure in doing things: 0 - not at all  Feeling down, depressed, or hopeless: 0 - not at all  PHQ-2 Score: 0       General Health  · Sleep: sleeps well  · Hearing: normal - bilateral   · Vision: no vision problems, most recent eye exam >1 year ago and wears contacts  · Dental: brushes teeth twice daily  /GYN Health  · Last GYN visit was 4 years ago after she delivered her son     Review of Systems:     Review of Systems   Constitutional: Negative for chills, diaphoresis, fatigue and fever  HENT: Negative for congestion, ear pain, postnasal drip, rhinorrhea, sneezing, sore throat and trouble swallowing  Eyes: Negative for pain and visual disturbance  Respiratory: Negative for apnea, cough, shortness of breath and wheezing  Cardiovascular: Negative for chest pain and palpitations  Gastrointestinal: Negative for abdominal pain, constipation, diarrhea, nausea and vomiting  Genitourinary: Negative for dysuria and hematuria  Musculoskeletal: Negative for arthralgias, gait problem and myalgias  Neurological: Negative for dizziness, syncope, weakness, light-headedness, numbness and headaches  Psychiatric/Behavioral: Negative for suicidal ideas  The patient is not nervous/anxious  Past Medical History:     History reviewed  No pertinent past medical history  Past Surgical History:     History reviewed  No pertinent surgical history     Social History:        Social History     Socioeconomic History    Marital status: Single     Spouse name: None    Number of children: None    Years of education: None    Highest education level: None   Occupational History    None   Social Needs    Financial resource strain: None    Food insecurity     Worry: None     Inability: None    Transportation needs     Medical: None     Non-medical: None   Tobacco Use    Smoking status: Never Smoker    Smokeless tobacco: Never Used   Substance and Sexual Activity    Alcohol use: No    Drug use: No    Sexual activity: None   Lifestyle    Physical activity     Days per week: None     Minutes per session: None    Stress: None   Relationships    Social connections     Talks on phone: None     Gets together: None     Attends Anabaptism service: None     Active member of club or organization: None     Attends meetings of clubs or organizations: None     Relationship status: None    Intimate partner violence     Fear of current or ex partner: None     Emotionally abused: None     Physically abused: None     Forced sexual activity: None   Other Topics Concern    None   Social History Narrative    None      Family History:     Family History   Problem Relation Age of Onset    No Known Problems Mother     Throat cancer Father       Current Medications:     No current outpatient medications on file  No current facility-administered medications for this visit  Allergies: Allergies   Allergen Reactions    Apple Itching     Throat itching      Physical Exam:     /82 (BP Location: Left arm, Patient Position: Sitting, Cuff Size: Standard)   Temp (!) 96 °F (35 6 °C) (Tympanic)   Ht 5' 2" (1 575 m)   Wt 84 8 kg (187 lb)   BMI 34 20 kg/m²     Physical Exam  Vitals signs and nursing note reviewed  Constitutional:       General: She is not in acute distress  Appearance: She is well-developed  She is not diaphoretic  HENT:      Head: Normocephalic and atraumatic  Right Ear: Hearing, tympanic membrane, ear canal and external ear normal       Left Ear: Hearing, tympanic membrane, ear canal and external ear normal       Nose: Nose normal  No mucosal edema or rhinorrhea  Mouth/Throat:      Pharynx: No oropharyngeal exudate or posterior oropharyngeal erythema  Neck:      Musculoskeletal: Normal range of motion and neck supple  Cardiovascular:      Rate and Rhythm: Normal rate and regular rhythm  Heart sounds: Normal heart sounds  No murmur  No friction rub  No gallop  Pulmonary:      Effort: Pulmonary effort is normal  No respiratory distress  Breath sounds: Normal breath sounds   No wheezing or rales  Abdominal:      General: Bowel sounds are normal  There is no distension  Palpations: Abdomen is soft  Tenderness: There is no abdominal tenderness  There is no guarding  Musculoskeletal: Normal range of motion  Lymphadenopathy:      Cervical: No cervical adenopathy  Skin:     General: Skin is warm and dry  Findings: No rash  Neurological:      Mental Status: She is alert and oriented to person, place, and time  Psychiatric:         Behavior: Behavior normal          Thought Content:  Thought content normal          Judgment: Judgment normal           Shantanu Tijerina PA-C   [address] [facility]

## 2022-01-21 ENCOUNTER — OCCMED (OUTPATIENT)
Dept: URGENT CARE | Facility: CLINIC | Age: 27
End: 2022-01-21

## 2022-01-21 DIAGNOSIS — Z02.1 PHYSICAL EXAM, PRE-EMPLOYMENT: Primary | ICD-10-CM

## 2022-01-21 PROCEDURE — 86480 TB TEST CELL IMMUN MEASURE: CPT

## 2022-01-24 LAB
GAMMA INTERFERON BACKGROUND BLD IA-ACNC: 0.03 IU/ML
M TB IFN-G BLD-IMP: NEGATIVE
M TB IFN-G CD4+ BCKGRND COR BLD-ACNC: 0 IU/ML
M TB IFN-G CD4+ BCKGRND COR BLD-ACNC: 0.01 IU/ML
MITOGEN IGNF BCKGRD COR BLD-ACNC: >10 IU/ML

## 2022-12-21 ENCOUNTER — VBI (OUTPATIENT)
Dept: ADMINISTRATIVE | Facility: OTHER | Age: 27
End: 2022-12-21

## 2023-04-04 ENCOUNTER — OFFICE VISIT (OUTPATIENT)
Dept: GYNECOLOGY | Facility: CLINIC | Age: 28
End: 2023-04-04

## 2023-04-04 VITALS
BODY MASS INDEX: 38.53 KG/M2 | HEART RATE: 105 BPM | SYSTOLIC BLOOD PRESSURE: 104 MMHG | WEIGHT: 209.4 LBS | HEIGHT: 62 IN | DIASTOLIC BLOOD PRESSURE: 68 MMHG

## 2023-04-04 DIAGNOSIS — N94.6 DYSMENORRHEA: Primary | ICD-10-CM

## 2023-04-04 NOTE — PROGRESS NOTES
Assessment/Plan:    Pt advised to call if she bleeds for more than 8 days, has an interval of less than 21 or more than 60 days, bleeds through more than one tampon or pad in 1 hour or continues with cramping after her menses is done  Discussed hormonal options as well as research on ibuprofen  She will continue to monitor  Will RTO for routine gyn care at the completion of bleeding  Diagnoses and all orders for this visit:    Dysmenorrhea        Subjective:      Patient ID: Rufina Bowers is a 32 y o  female  New pt presents for irregular bleed  She reports regular monthly menses with 7 days of bleeding, 1 day light, heavy 2-4, lighter 5-7  She denies using more than one tampon or pad in 1 hour  She's concerned because this last menses was a shorter interval of 21 days  She had bleeding with cramping that started on 3/13/23, lasted 7 days and then started another menses 4/1/23, 21 days and she is currently on day 4 of cycle  This is the first time she has had a shorter interval between menses  She had cramping before this bleed as well  Denies interval of >60 days between menses  She is sexually active with female partner  Has not had routine gyn care since birth of her son 7 yrs ago, but is currently bleeding  No other gyn concerns  The following portions of the patient's history were reviewed and updated as appropriate: allergies, current medications, past family history, past medical history, past social history, past surgical history and problem list     Review of Systems   Constitutional: Negative  HENT: Negative  Respiratory: Negative  Cardiovascular: Negative  Gastrointestinal: Negative  Endocrine: Negative  Genitourinary: Positive for menstrual problem  Negative for difficulty urinating, dyspareunia, dysuria, frequency, pelvic pain, urgency, vaginal bleeding, vaginal discharge and vaginal pain  Musculoskeletal: Negative  Skin: Negative  Neurological: Negative  "  Psychiatric/Behavioral: Negative  Objective:      /68   Pulse 105   Ht 5' 2\" (1 575 m)   Wt 95 kg (209 lb 6 4 oz)   LMP 03/13/2023   BMI 38 30 kg/m²          Physical Exam  Vitals and nursing note reviewed  Constitutional:       Appearance: Normal appearance  HENT:      Head: Normocephalic and atraumatic  Pulmonary:      Effort: Pulmonary effort is normal    Musculoskeletal:         General: Normal range of motion  Cervical back: Normal range of motion  Skin:     General: Skin is warm and dry  Neurological:      Mental Status: She is alert and oriented to person, place, and time  Psychiatric:         Mood and Affect: Mood normal          Behavior: Behavior normal          Thought Content:  Thought content normal          Judgment: Judgment normal          "